# Patient Record
Sex: FEMALE | Race: WHITE | Employment: FULL TIME | URBAN - METROPOLITAN AREA
[De-identification: names, ages, dates, MRNs, and addresses within clinical notes are randomized per-mention and may not be internally consistent; named-entity substitution may affect disease eponyms.]

---

## 2020-04-27 LAB
ABO, EXTERNAL RESULT: NORMAL
HEP B, EXTERNAL RESULT: NORMAL
HIV, EXTERNAL RESULT: NORMAL
RH FACTOR, EXTERNAL RESULT: NORMAL
RPR, EXTERNAL RESULT: NORMAL
RUBELLA TITER, EXTERNAL RESULT: NORMAL

## 2020-09-11 ENCOUNTER — HOSPITAL ENCOUNTER (OUTPATIENT)
Age: 45
Discharge: HOME | End: 2020-09-11
Attending: OBSTETRICS & GYNECOLOGY | Admitting: OBSTETRICS & GYNECOLOGY
Payer: COMMERCIAL

## 2020-09-11 VITALS
TEMPERATURE: 97.3 F | HEIGHT: 67 IN | SYSTOLIC BLOOD PRESSURE: 107 MMHG | WEIGHT: 202 LBS | DIASTOLIC BLOOD PRESSURE: 66 MMHG | HEART RATE: 75 BPM | BODY MASS INDEX: 31.71 KG/M2 | RESPIRATION RATE: 16 BRPM

## 2020-09-11 LAB
CREATININE URINE: 17.8 MG/DL (ref 28–259)
PROTEIN PROTEIN: <4 MG/DL
PROTEIN/CREAT RATIO: ABNORMAL MG/DL

## 2020-09-11 PROCEDURE — 59025 FETAL NON-STRESS TEST: CPT

## 2020-09-11 PROCEDURE — 82570 ASSAY OF URINE CREATININE: CPT

## 2020-09-11 PROCEDURE — 84156 ASSAY OF PROTEIN URINE: CPT

## 2020-09-11 RX ORDER — ASPIRIN 81 MG/1
81 TABLET ORAL DAILY
Status: ON HOLD | COMMUNITY
End: 2020-11-07 | Stop reason: HOSPADM

## 2020-09-11 NOTE — PROGRESS NOTES
Pt came to triage concerned of edema and possible elevated BP at home. 39 yo G1 IVF pregnancy. Since office is closed, came to triage. VSS with normal range BP, asymptomatic. Abdomen soft, NT, gravid, DRT 1+, 1+ pitting LE edema. UPCR was not calculated due to small amount of creatinine and protein in urine sample, pt states that is well hydrated. NST: reactive. Reassurance provided, RTO 4 days for routine appt.   Electronically signed by Drea Juares MD on 9/11/2020 at 6:34 PM

## 2020-09-11 NOTE — PROGRESS NOTES
Pt arrived to triage room D from home for reports of headache and LE swelling and pt unable to be seen in office today. Pt oriented to room and call light. Consents signed and placed in chart. Pt reports normal fetal movement and denies bleeding and denies leaking. Abd soft and non tender. Pt placed on EFM to central bank monitor.  MD called and notified of pt arrival.

## 2020-09-11 NOTE — FLOWSHEET NOTE
Dr. Makayla Estrada updated on pt status per JULIO CÉSAR Keene RN. Labs, vitals and tracing reviewed. Dr. Makayla Estrada and this RN to bedside. Plan of care discussed including discharge home and symptoms to monitor. Discharge teaching completed with pt verbalizes understanding.   Denies questions or concerns for RN/MD.

## 2020-09-11 NOTE — FLOWSHEET NOTE
09/11/20 1845   Fetal Heart Rate   Mode External US   Baseline Rate 130 bpm   Baseline Classification Normal   Variability 6-25 BPM   Pattern A   Patient Feels Fetal Movement Yes   OB Bladder Status Non-distended   OB Bladder Intervention Voiding with Relief   Multiple birth? no   Fetal Monitoring Strip   FMS Reviewed? Yes   FMS Reviewed By? Dr. Jesus Hernández   Uterine Activity   Mode Palpation; [place]   Contractions   (irritability)   Contraction Frequency none   Contraction Duration n/a   Contraction Quality N/A   Resting Tone Palpated Soft

## 2020-10-13 LAB — GBS, EXTERNAL RESULT: NORMAL

## 2020-10-28 ENCOUNTER — OFFICE VISIT (OUTPATIENT)
Dept: PRIMARY CARE CLINIC | Age: 45
End: 2020-10-28
Payer: COMMERCIAL

## 2020-10-28 PROCEDURE — 99211 OFF/OP EST MAY X REQ PHY/QHP: CPT | Performed by: NURSE PRACTITIONER

## 2020-10-28 NOTE — PROGRESS NOTES
Patient presented to [facility] drive up clinic for preop testing. Patient was swabbed and given information advising them to remain isolated until procedure date.

## 2020-10-29 LAB — SARS-COV-2: NOT DETECTED

## 2020-11-06 ENCOUNTER — ANESTHESIA EVENT (OUTPATIENT)
Dept: LABOR AND DELIVERY | Age: 45
End: 2020-11-06
Payer: COMMERCIAL

## 2020-11-06 ENCOUNTER — ANESTHESIA (OUTPATIENT)
Dept: LABOR AND DELIVERY | Age: 45
End: 2020-11-06
Payer: COMMERCIAL

## 2020-11-06 ENCOUNTER — HOSPITAL ENCOUNTER (INPATIENT)
Age: 45
LOS: 3 days | Discharge: HOME | End: 2020-11-09
Attending: OBSTETRICS & GYNECOLOGY | Admitting: OBSTETRICS & GYNECOLOGY
Payer: COMMERCIAL

## 2020-11-06 VITALS — SYSTOLIC BLOOD PRESSURE: 121 MMHG | DIASTOLIC BLOOD PRESSURE: 62 MMHG | OXYGEN SATURATION: 100 %

## 2020-11-06 PROBLEM — Z34.90 TERM PREGNANCY: Status: ACTIVE | Noted: 2020-11-06

## 2020-11-06 LAB
ABO/RH: NORMAL
ABO/RH: NORMAL
AMPHETAMINE SCREEN, URINE: NORMAL
ANTIBODY IDENTIFICATION: NORMAL
ANTIBODY SCREEN: NORMAL
BARBITURATE SCREEN URINE: NORMAL
BENZODIAZEPINE SCREEN, URINE: NORMAL
BUPRENORPHINE URINE: NORMAL
CANNABINOID SCREEN URINE: NORMAL
COCAINE METABOLITE SCREEN URINE: NORMAL
DAT IGG CAPTURE: NORMAL
FETAL SCREEN: NORMAL
HCT VFR BLD CALC: 36 % (ref 36–48)
HEMOGLOBIN: 12 G/DL (ref 12–16)
Lab: NORMAL
MCH RBC QN AUTO: 29.1 PG (ref 26–34)
MCHC RBC AUTO-ENTMCNC: 33.3 G/DL (ref 31–36)
MCV RBC AUTO: 87.3 FL (ref 80–100)
METHADONE SCREEN, URINE: NORMAL
OPIATE SCREEN URINE: NORMAL
OXYCODONE URINE: NORMAL
PDW BLD-RTO: 13.3 % (ref 12.4–15.4)
PH UA: 6.5
PHENCYCLIDINE SCREEN URINE: NORMAL
PLATELET # BLD: 269 K/UL (ref 135–450)
PMV BLD AUTO: 7.1 FL (ref 5–10.5)
PROPOXYPHENE SCREEN: NORMAL
RBC # BLD: 4.12 M/UL (ref 4–5.2)
RHIG LOT NUMBER: NORMAL
TOTAL SYPHILLIS IGG/IGM: NORMAL
WBC # BLD: 11.1 K/UL (ref 4–11)

## 2020-11-06 PROCEDURE — 85027 COMPLETE CBC AUTOMATED: CPT

## 2020-11-06 PROCEDURE — 3609079900 HC CESAREAN SECTION: Performed by: OBSTETRICS & GYNECOLOGY

## 2020-11-06 PROCEDURE — 2500000003 HC RX 250 WO HCPCS: Performed by: NURSE ANESTHETIST, CERTIFIED REGISTERED

## 2020-11-06 PROCEDURE — 36415 COLL VENOUS BLD VENIPUNCTURE: CPT

## 2020-11-06 PROCEDURE — 2580000003 HC RX 258: Performed by: OBSTETRICS & GYNECOLOGY

## 2020-11-06 PROCEDURE — 6360000002 HC RX W HCPCS: Performed by: OBSTETRICS & GYNECOLOGY

## 2020-11-06 PROCEDURE — 86880 COOMBS TEST DIRECT: CPT

## 2020-11-06 PROCEDURE — 86922 COMPATIBILITY TEST ANTIGLOB: CPT

## 2020-11-06 PROCEDURE — 6360000002 HC RX W HCPCS: Performed by: ANESTHESIOLOGY

## 2020-11-06 PROCEDURE — 2580000003 HC RX 258: Performed by: NURSE ANESTHETIST, CERTIFIED REGISTERED

## 2020-11-06 PROCEDURE — 7100000001 HC PACU RECOVERY - ADDTL 15 MIN: Performed by: OBSTETRICS & GYNECOLOGY

## 2020-11-06 PROCEDURE — 1220000000 HC SEMI PRIVATE OB R&B

## 2020-11-06 PROCEDURE — 6370000000 HC RX 637 (ALT 250 FOR IP): Performed by: OBSTETRICS & GYNECOLOGY

## 2020-11-06 PROCEDURE — 86780 TREPONEMA PALLIDUM: CPT

## 2020-11-06 PROCEDURE — 86900 BLOOD TYPING SEROLOGIC ABO: CPT

## 2020-11-06 PROCEDURE — 86870 RBC ANTIBODY IDENTIFICATION: CPT

## 2020-11-06 PROCEDURE — 2709999900 HC NON-CHARGEABLE SUPPLY: Performed by: OBSTETRICS & GYNECOLOGY

## 2020-11-06 PROCEDURE — 80307 DRUG TEST PRSMV CHEM ANLYZR: CPT

## 2020-11-06 PROCEDURE — 7100000000 HC PACU RECOVERY - FIRST 15 MIN: Performed by: OBSTETRICS & GYNECOLOGY

## 2020-11-06 PROCEDURE — 86850 RBC ANTIBODY SCREEN: CPT

## 2020-11-06 PROCEDURE — 2500000003 HC RX 250 WO HCPCS: Performed by: OBSTETRICS & GYNECOLOGY

## 2020-11-06 PROCEDURE — 86901 BLOOD TYPING SEROLOGIC RH(D): CPT

## 2020-11-06 PROCEDURE — 3700000001 HC ADD 15 MINUTES (ANESTHESIA): Performed by: OBSTETRICS & GYNECOLOGY

## 2020-11-06 PROCEDURE — 85461 HEMOGLOBIN FETAL: CPT

## 2020-11-06 PROCEDURE — 96372 THER/PROPH/DIAG INJ SC/IM: CPT

## 2020-11-06 PROCEDURE — 6360000002 HC RX W HCPCS: Performed by: NURSE ANESTHETIST, CERTIFIED REGISTERED

## 2020-11-06 PROCEDURE — 3700000000 HC ANESTHESIA ATTENDED CARE: Performed by: OBSTETRICS & GYNECOLOGY

## 2020-11-06 PROCEDURE — 59025 FETAL NON-STRESS TEST: CPT

## 2020-11-06 RX ORDER — SODIUM CHLORIDE 0.9 % (FLUSH) 0.9 %
10 SYRINGE (ML) INJECTION EVERY 12 HOURS SCHEDULED
Status: DISCONTINUED | OUTPATIENT
Start: 2020-11-06 | End: 2020-11-09 | Stop reason: HOSPADM

## 2020-11-06 RX ORDER — KETOROLAC TROMETHAMINE 30 MG/ML
30 INJECTION, SOLUTION INTRAMUSCULAR; INTRAVENOUS EVERY 6 HOURS
Status: COMPLETED | OUTPATIENT
Start: 2020-11-06 | End: 2020-11-07

## 2020-11-06 RX ORDER — LANOLIN 100 %
OINTMENT (GRAM) TOPICAL
Status: DISCONTINUED | OUTPATIENT
Start: 2020-11-06 | End: 2020-11-09 | Stop reason: HOSPADM

## 2020-11-06 RX ORDER — ONDANSETRON 2 MG/ML
4 INJECTION INTRAMUSCULAR; INTRAVENOUS EVERY 6 HOURS PRN
Status: DISCONTINUED | OUTPATIENT
Start: 2020-11-06 | End: 2020-11-06 | Stop reason: HOSPADM

## 2020-11-06 RX ORDER — EPHEDRINE SULFATE/0.9% NACL/PF 50 MG/5 ML
SYRINGE (ML) INTRAVENOUS PRN
Status: DISCONTINUED | OUTPATIENT
Start: 2020-11-06 | End: 2020-11-06 | Stop reason: SDUPTHER

## 2020-11-06 RX ORDER — ONDANSETRON 2 MG/ML
4 INJECTION INTRAMUSCULAR; INTRAVENOUS EVERY 6 HOURS PRN
Status: DISCONTINUED | OUTPATIENT
Start: 2020-11-06 | End: 2020-11-09 | Stop reason: HOSPADM

## 2020-11-06 RX ORDER — OXYTOCIN 10 [USP'U]/ML
INJECTION, SOLUTION INTRAMUSCULAR; INTRAVENOUS PRN
Status: DISCONTINUED | OUTPATIENT
Start: 2020-11-06 | End: 2020-11-06 | Stop reason: SDUPTHER

## 2020-11-06 RX ORDER — ACETAMINOPHEN 650 MG/1
650 SUPPOSITORY RECTAL EVERY 4 HOURS PRN
Status: ACTIVE | OUTPATIENT
Start: 2020-11-06 | End: 2020-11-07

## 2020-11-06 RX ORDER — PRENATAL VIT/IRON FUM/FOLIC AC 27MG-0.8MG
1 TABLET ORAL DAILY
Status: DISCONTINUED | OUTPATIENT
Start: 2020-11-07 | End: 2020-11-09 | Stop reason: HOSPADM

## 2020-11-06 RX ORDER — SODIUM CHLORIDE, SODIUM LACTATE, POTASSIUM CHLORIDE, CALCIUM CHLORIDE 600; 310; 30; 20 MG/100ML; MG/100ML; MG/100ML; MG/100ML
INJECTION, SOLUTION INTRAVENOUS CONTINUOUS
Status: DISCONTINUED | OUTPATIENT
Start: 2020-11-06 | End: 2020-11-09 | Stop reason: HOSPADM

## 2020-11-06 RX ORDER — DIPHENHYDRAMINE HYDROCHLORIDE 50 MG/ML
25 INJECTION INTRAMUSCULAR; INTRAVENOUS EVERY 6 HOURS PRN
Status: DISCONTINUED | OUTPATIENT
Start: 2020-11-06 | End: 2020-11-09 | Stop reason: HOSPADM

## 2020-11-06 RX ORDER — NALOXONE HYDROCHLORIDE 0.4 MG/ML
0.4 INJECTION, SOLUTION INTRAMUSCULAR; INTRAVENOUS; SUBCUTANEOUS PRN
Status: DISCONTINUED | OUTPATIENT
Start: 2020-11-06 | End: 2020-11-09 | Stop reason: HOSPADM

## 2020-11-06 RX ORDER — METHYLERGONOVINE MALEATE 0.2 MG/ML
200 INJECTION INTRAVENOUS PRN
Status: DISCONTINUED | OUTPATIENT
Start: 2020-11-06 | End: 2020-11-09 | Stop reason: HOSPADM

## 2020-11-06 RX ORDER — SODIUM CHLORIDE, SODIUM LACTATE, POTASSIUM CHLORIDE, AND CALCIUM CHLORIDE .6; .31; .03; .02 G/100ML; G/100ML; G/100ML; G/100ML
1000 INJECTION, SOLUTION INTRAVENOUS ONCE
Status: DISCONTINUED | OUTPATIENT
Start: 2020-11-06 | End: 2020-11-09 | Stop reason: HOSPADM

## 2020-11-06 RX ORDER — NICOTINE 21 MG/24HR
1 PATCH, TRANSDERMAL 24 HOURS TRANSDERMAL DAILY
Status: DISCONTINUED | OUTPATIENT
Start: 2020-11-06 | End: 2020-11-06

## 2020-11-06 RX ORDER — OXYCODONE HYDROCHLORIDE AND ACETAMINOPHEN 5; 325 MG/1; MG/1
1 TABLET ORAL EVERY 4 HOURS PRN
Status: DISCONTINUED | OUTPATIENT
Start: 2020-11-06 | End: 2020-11-09 | Stop reason: HOSPADM

## 2020-11-06 RX ORDER — OXYCODONE HYDROCHLORIDE AND ACETAMINOPHEN 5; 325 MG/1; MG/1
2 TABLET ORAL EVERY 4 HOURS PRN
Status: DISCONTINUED | OUTPATIENT
Start: 2020-11-06 | End: 2020-11-09 | Stop reason: HOSPADM

## 2020-11-06 RX ORDER — IBUPROFEN 400 MG/1
800 TABLET ORAL EVERY 8 HOURS PRN
Status: DISCONTINUED | OUTPATIENT
Start: 2020-11-07 | End: 2020-11-09 | Stop reason: HOSPADM

## 2020-11-06 RX ORDER — SODIUM CHLORIDE 0.9 % (FLUSH) 0.9 %
10 SYRINGE (ML) INJECTION PRN
Status: DISCONTINUED | OUTPATIENT
Start: 2020-11-06 | End: 2020-11-09 | Stop reason: HOSPADM

## 2020-11-06 RX ORDER — ONDANSETRON 2 MG/ML
INJECTION INTRAMUSCULAR; INTRAVENOUS PRN
Status: DISCONTINUED | OUTPATIENT
Start: 2020-11-06 | End: 2020-11-06 | Stop reason: SDUPTHER

## 2020-11-06 RX ORDER — KETOROLAC TROMETHAMINE 30 MG/ML
INJECTION, SOLUTION INTRAMUSCULAR; INTRAVENOUS PRN
Status: DISCONTINUED | OUTPATIENT
Start: 2020-11-06 | End: 2020-11-06 | Stop reason: SDUPTHER

## 2020-11-06 RX ORDER — NALBUPHINE HCL 10 MG/ML
5 AMPUL (ML) INJECTION EVERY 4 HOURS PRN
Status: DISCONTINUED | OUTPATIENT
Start: 2020-11-06 | End: 2020-11-06 | Stop reason: RX

## 2020-11-06 RX ORDER — ESTRADIOL 2 MG/1
2 TABLET ORAL DAILY
Status: ON HOLD | COMMUNITY
End: 2020-11-07 | Stop reason: HOSPADM

## 2020-11-06 RX ORDER — SIMETHICONE 80 MG
80 TABLET,CHEWABLE ORAL EVERY 6 HOURS PRN
Status: DISCONTINUED | OUTPATIENT
Start: 2020-11-06 | End: 2020-11-09 | Stop reason: HOSPADM

## 2020-11-06 RX ORDER — SODIUM CHLORIDE, SODIUM LACTATE, POTASSIUM CHLORIDE, CALCIUM CHLORIDE 600; 310; 30; 20 MG/100ML; MG/100ML; MG/100ML; MG/100ML
INJECTION, SOLUTION INTRAVENOUS CONTINUOUS PRN
Status: DISCONTINUED | OUTPATIENT
Start: 2020-11-06 | End: 2020-11-06 | Stop reason: SDUPTHER

## 2020-11-06 RX ORDER — SODIUM CHLORIDE, SODIUM LACTATE, POTASSIUM CHLORIDE, AND CALCIUM CHLORIDE .6; .31; .03; .02 G/100ML; G/100ML; G/100ML; G/100ML
1000 INJECTION, SOLUTION INTRAVENOUS ONCE
Status: COMPLETED | OUTPATIENT
Start: 2020-11-06 | End: 2020-11-06

## 2020-11-06 RX ORDER — FENTANYL CITRATE 50 UG/ML
INJECTION, SOLUTION INTRAMUSCULAR; INTRAVENOUS PRN
Status: DISCONTINUED | OUTPATIENT
Start: 2020-11-06 | End: 2020-11-06

## 2020-11-06 RX ORDER — MORPHINE SULFATE 10 MG/ML
INJECTION, SOLUTION INTRAMUSCULAR; INTRAVENOUS PRN
Status: DISCONTINUED | OUTPATIENT
Start: 2020-11-06 | End: 2020-11-06 | Stop reason: SDUPTHER

## 2020-11-06 RX ORDER — DOCUSATE SODIUM 100 MG/1
100 CAPSULE, LIQUID FILLED ORAL 2 TIMES DAILY
Status: DISCONTINUED | OUTPATIENT
Start: 2020-11-06 | End: 2020-11-09 | Stop reason: HOSPADM

## 2020-11-06 RX ORDER — FENTANYL CITRATE 50 UG/ML
INJECTION, SOLUTION INTRAMUSCULAR; INTRAVENOUS PRN
Status: DISCONTINUED | OUTPATIENT
Start: 2020-11-06 | End: 2020-11-06 | Stop reason: SDUPTHER

## 2020-11-06 RX ORDER — METOCLOPRAMIDE HYDROCHLORIDE 5 MG/ML
10 INJECTION INTRAMUSCULAR; INTRAVENOUS ONCE
Status: COMPLETED | OUTPATIENT
Start: 2020-11-06 | End: 2020-11-06

## 2020-11-06 RX ADMIN — Medication 10 MG: at 10:28

## 2020-11-06 RX ADMIN — HUMAN RHO(D) IMMUNE GLOBULIN 300 MCG: 300 INJECTION, SOLUTION INTRAMUSCULAR at 20:32

## 2020-11-06 RX ADMIN — MORPHINE SULFATE 0.25 MG: 10 INJECTION INTRAVENOUS at 10:23

## 2020-11-06 RX ADMIN — FENTANYL CITRATE 12.5 MCG: 50 INJECTION INTRAMUSCULAR; INTRAVENOUS at 10:23

## 2020-11-06 RX ADMIN — CEFAZOLIN SODIUM 2 G: 10 INJECTION, POWDER, FOR SOLUTION INTRAVENOUS at 10:15

## 2020-11-06 RX ADMIN — SODIUM CHLORIDE, SODIUM LACTATE, POTASSIUM CHLORIDE, AND CALCIUM CHLORIDE: .6; .31; .03; .02 INJECTION, SOLUTION INTRAVENOUS at 10:49

## 2020-11-06 RX ADMIN — ONDANSETRON 4 MG: 2 INJECTION INTRAMUSCULAR; INTRAVENOUS at 10:15

## 2020-11-06 RX ADMIN — KETOROLAC TROMETHAMINE 30 MG: 30 INJECTION, SOLUTION INTRAMUSCULAR at 11:14

## 2020-11-06 RX ADMIN — Medication 334 ML/HR: at 11:18

## 2020-11-06 RX ADMIN — Medication 10 MG: at 10:35

## 2020-11-06 RX ADMIN — Medication 10 MG: at 10:53

## 2020-11-06 RX ADMIN — SODIUM CHLORIDE, POTASSIUM CHLORIDE, SODIUM LACTATE AND CALCIUM CHLORIDE 1000 ML: 600; 310; 30; 20 INJECTION, SOLUTION INTRAVENOUS at 09:14

## 2020-11-06 RX ADMIN — Medication 10 MG: at 10:48

## 2020-11-06 RX ADMIN — Medication 10 MG: at 11:01

## 2020-11-06 RX ADMIN — DOCUSATE SODIUM 100 MG: 100 CAPSULE, LIQUID FILLED ORAL at 20:31

## 2020-11-06 RX ADMIN — METOCLOPRAMIDE HYDROCHLORIDE 10 MG: 5 INJECTION INTRAMUSCULAR; INTRAVENOUS at 08:54

## 2020-11-06 RX ADMIN — SODIUM CHLORIDE, SODIUM LACTATE, POTASSIUM CHLORIDE, AND CALCIUM CHLORIDE: .6; .31; .03; .02 INJECTION, SOLUTION INTRAVENOUS at 11:19

## 2020-11-06 RX ADMIN — FAMOTIDINE 20 MG: 10 INJECTION INTRAVENOUS at 08:54

## 2020-11-06 RX ADMIN — SODIUM CHLORIDE, SODIUM LACTATE, POTASSIUM CHLORIDE, AND CALCIUM CHLORIDE: .6; .31; .03; .02 INJECTION, SOLUTION INTRAVENOUS at 10:15

## 2020-11-06 RX ADMIN — KETOROLAC TROMETHAMINE 30 MG: 30 INJECTION, SOLUTION INTRAMUSCULAR at 20:31

## 2020-11-06 RX ADMIN — OXYTOCIN 20 UNITS: 10 INJECTION INTRAVENOUS at 10:48

## 2020-11-06 RX ADMIN — Medication: at 11:35

## 2020-11-06 ASSESSMENT — PULMONARY FUNCTION TESTS
PIF_VALUE: 0
PIF_VALUE: 1
PIF_VALUE: 0

## 2020-11-06 ASSESSMENT — PAIN SCALES - GENERAL
PAINLEVEL_OUTOF10: 0

## 2020-11-06 NOTE — ANESTHESIA PRE PROCEDURE
Department of Anesthesiology  Preprocedure Note       Name:  Radha Baron   Age:  40 y.o.  :  1975                                          MRN:  [id number]         Date:  2020      Surgeon: Radha Soto):  Daniel Garrett MD    Procedure: Procedure(s):   SECTION    Medications prior to admission:   Prior to Admission medications    Medication Sig Start Date End Date Taking?  Authorizing Provider   Prenatal MV-Min-Fe Fum-FA-DHA (PRENATAL 1 PO) Take 1 tablet by mouth daily   Yes Historical Provider, MD   aspirin 81 MG EC tablet Take 81 mg by mouth daily   Yes Historical Provider, MD   estradiol (ESTRACE) 2 MG tablet Take 2 mg by mouth daily During first & half of second trimester    Historical Provider, MD   PROGESTERONE IM Inject into the muscle First trimester & half of second trimester    Historical Provider, MD       Current medications:    Current Facility-Administered Medications   Medication Dose Route Frequency Provider Last Rate Last Dose    lactated ringers infusion   Intravenous Continuous Daniel Garrett MD        sodium chloride flush 0.9 % injection 10 mL  10 mL Intravenous 2 times per day Daniel Garrett MD        sodium chloride flush 0.9 % injection 10 mL  10 mL Intravenous PRN Daniel Garrett MD        oxytocin (PITOCIN) 30 units in 500 mL infusion   Intravenous PRN Daniel Garrett MD        And    oxytocin (PITOCIN) 30 units in 500 mL infusion  95 shmuel-units/min Intravenous PRN Daniel Garrett MD        lactated ringers infusion   Intravenous Continuous Daniel Garrett MD        lactated ringers bolus  1,000 mL Intravenous Once Daniel Garrett MD 1,000 mL/hr at 20 0854      sodium chloride flush 0.9 % injection 10 mL  10 mL Intravenous 2 times per day Daniel Garrett MD        sodium chloride flush 0.9 % injection 10 mL  10 mL Intravenous PRN Daniel Garrett MD        topical skin adhesive stick   Topical Once Aye Kraft, MD        ondansetron (ZOFRAN) injection 4 mg  4 mg Intravenous Q6H PRN Catrina Young MD         Facility-Administered Medications Ordered in Other Encounters   Medication Dose Route Frequency Provider Last Rate Last Dose    lactated ringers infusion    Continuous PRN Susy Hurst, APRN - CRNA        ondansetron [facility] injection    PRN Susy Hurst, APRN - CRNA   4 mg at 11/06/20 1015    ePHEDrine injection    PRN Susy Hurst, APRN - CRNA   10 mg at 11/06/20 1035       Allergies:  No Known Allergies    Problem List:    Patient Active Problem List   Diagnosis Code    Cholecystitis with cholelithiasis K80.10    Term pregnancy Z34.90       Past Medical History:        Diagnosis Date    Anesthesia complication     slow to wake up    Gall stones     IBS (irritable bowel syndrome)     In vitro fertilization     Infertility, female     Mononucleosis     UTI (lower urinary tract infection) 4weeks ago       Past Surgical History:        Procedure Laterality Date    CHOLECYSTECTOMY, LAPAROSCOPIC  2/11/14    ROTATOR CUFF REPAIR Left     WISDOM TOOTH EXTRACTION         Social History:    Social History     Tobacco Use    Smoking status: Never Smoker    Smokeless tobacco: Never Used   Substance Use Topics    Alcohol use: Not Currently     Alcohol/week: 2.0 standard drinks     Types: 2 Glasses of wine per week     Comment: social                                Counseling given: Not Answered      Vital Signs (Current):   Vitals:    11/06/20 0857   BP: 110/77   Pulse: 92   Resp: 20   Temp: 36.8 °C (98.3 °F)   TempSrc: Oral   Weight: 216 lb (98 kg)   Height: 5' 7\" (1.702 m)                                              BP Readings from Last 3 Encounters:   11/06/20 110/77   11/06/20 114/63   09/11/20 107/66       NPO Status:                                                                                 BMI:   Wt Readings from Last 3 Encounters:   11/06/20 216 lb (98 kg)   09/11/20 202 lb (91.6 kg) ROS.                                       Anesthesia Plan      spinal     ASA 2             Anesthetic plan and risks discussed with patient and spouse. Use of blood products discussed with patient and spouse whom consented to blood products. Plan discussed with CRNA. OB History        1    Para        Term                AB        Living           SAB        TAB        Ectopic        Molar        Multiple        Live Births                    Quinten Osborne is a 40y.o. year-old female admitted to [facility], for SAB. Gestational age is 38w3d. Her Body mass index is 33.83 kg/m². She was seen, examined and her chart was reviewed (including anesthesia, drug and allergy history). No interval changes are noted to her history and physical examination. (except as noted above).     Risks/benefits/alternatives of both neuraxial and general anesthesia were discussed and she agrees to proceed at the direction of the care team.    NICO Terrazas CRNA  2020  10:40 AM  NICO Terrazas CRNA   2020

## 2020-11-06 NOTE — H&P
Department of Obstetrics and Gynecology   Obstetrics History and Physical        CHIEF COMPLAINT:  Elective CS    HISTORY OF PRESENT ILLNESS:    Melissa Current  is a 40 y.o.  female at 38w3d presents with a chief complaint as above and is being admitted for elective CS without tubal ligation     Estimated Due Date: Estimated Date of Delivery: 11/10/20    PRENATAL CARE: Complicated by: 1. AMA  2. IVF pregnancy     PAST OB HISTORY:  OB History        1    Para        Term                AB        Living           SAB        TAB        Ectopic        Molar        Multiple        Live Births                  Past Medical History:        Diagnosis Date    Anesthesia complication     slow to wake up    Gall stones     IBS (irritable bowel syndrome)     In vitro fertilization     Infertility, female     Mononucleosis     UTI (lower urinary tract infection) 4weeks ago     Past Surgical History:        Procedure Laterality Date    CHOLECYSTECTOMY, LAPAROSCOPIC  14    ROTATOR CUFF REPAIR Left     WISDOM TOOTH EXTRACTION       Allergies:  Patient has no known allergies.   Social History:    Social History     Socioeconomic History    Marital status:      Spouse name: Not on file    Number of children: Not on file    Years of education: Not on file    Highest education level: Not on file   Occupational History    Not on file   Social Needs    Financial resource strain: Not on file    Food insecurity     Worry: Not on file     Inability: Not on file   [employer] needs     Medical: Not on file     Non-medical: Not on file   Tobacco Use    Smoking status: Never Smoker    Smokeless tobacco: Never Used   Substance and Sexual Activity    Alcohol use: Not Currently     Alcohol/week: 2.0 standard drinks     Types: 2 Glasses of wine per week     Comment: social    Drug use: No    Sexual activity: Yes     Partners: Male   Lifestyle    Physical activity     Days per week: Not on file     Minutes per session: Not on file    Stress: Not on file   Relationships    Social connections     Talks on phone: Not on file     Gets together: Not on file     Attends [employer] service: Not on file     Active member of club or organization: Not on file     Attends meetings of clubs or organizations: Not on file     Relationship status: Not on file    Intimate partner violence     Fear of current or ex partner: Not on file     Emotionally abused: Not on file     Physically abused: Not on file     Forced sexual activity: Not on file   Other Topics Concern    Not on file   Social History Narrative    Not on file     Family History:       Problem Relation Age of Onset    Breast Cancer Maternal Grandmother 44    Diabetes Mother     Hypertension Mother     Mult Sclerosis Father      Medications Prior to Admission:  Medications Prior to Admission: Prenatal MV-Min-Fe Fum-FA-DHA (PRENATAL 1 PO), Take 1 tablet by mouth daily  aspirin 81 MG EC tablet, Take 81 mg by mouth daily  estradiol (ESTRACE) 2 MG tablet, Take 2 mg by mouth daily During first & half of second trimester  PROGESTERONE IM, Inject into the muscle First trimester & half of second trimester    REVIEW OF SYSTEMS:  negative     PHYSICAL EXAM:    Vitals:    11/06/20 0857   BP: 110/77   Pulse: 92   Resp: 20   Temp: 98.3 °F (36.8 °C)   TempSrc: Oral   Weight: 216 lb (98 kg)   Height: 5' 7\" (1.702 m)     General appearance:  awake, alert, cooperative, no apparent distress, and appears stated age  Neurologic:  Awake, alert, oriented to name, place and time.     Lungs:  No increased work of breathing, good air exchange  Abdomen:  Soft, non tender, gravid, fundal height consistent with the gestational age, EFW by Leopold's maneuvers is 3400 grams  Pelvis:  Adequate pelvis  Cervix: closed  Contraction frequency: none  Membranes:  Intact  Labs:   CBC:   Lab Results   Component Value Date    WBC 11.1 11/06/2020    RBC 4.12 11/06/2020    HGB 12.0 11/06/2020    HCT 36.0 11/06/2020    MCV 87.3 11/06/2020    MCH 29.1 11/06/2020    MCHC 33.3 11/06/2020    RDW 13.3 11/06/2020     11/06/2020    MPV 7.1 11/06/2020       ASSESSMENT: 41 yo G1 @ 39 3/7 w    1.  Elective CS due to [facility] and IVF pregnancy     PLAN: Admit  Labor: Discussed RBC of surgery at length, expectations of recovery and all Qs were answered   Fetus: Reassuring  GBS: Negative    Electronically signed by Yasmeen Babb MD on 11/6/2020 at 10:05 AM

## 2020-11-06 NOTE — ANESTHESIA PROCEDURE NOTES
Spinal Block    Patient location during procedure: OB  Start time: 11/6/2020 10:14 AM  End time: 11/6/2020 10:25 AM  Reason for block: primary anesthetic  Preanesthetic Checklist  Completed: patient identified, site marked, surgical consent, pre-op evaluation, timeout performed, IV checked, risks and benefits discussed, monitors and equipment checked, anesthesia consent given, oxygen available and patient being monitored  Spinal Block  Patient position: sitting  Prep: Betadine and site prepped and draped  Patient monitoring: continuous pulse ox and frequent blood pressure checks  Approach: midline  Location: L3/L4  Procedures: paresthesia technique  Provider prep: mask and sterile gloves  Local infiltration: lidocaine  Agent: bupivacaine  Dose: 1.9  Dose: 1.9  Needle  Needle type:  Gage   Needle gauge: 27 G  Needle length: 3.5 in  Needle insertion depth: 6 cm  Assessment  Sensory level: T4  CSF: clear  Attempts: 1  Hemodynamics: stable  Additional Notes  Duramorph 0.25 mg & Fentanyl 12.5 mcg

## 2020-11-06 NOTE — FLOWSHEET NOTE
Dr Gualberto Santana notified uterus boggy at times but firms immediately with massage. Bleeding WNL. No clots. Dr Gualberto Santana came to bedside & palpated fundus & found it to be firm.

## 2020-11-06 NOTE — PLAN OF CARE
Problem: Anxiety:  Goal: Level of anxiety will decrease  Description: Level of anxiety will decrease  Outcome: Ongoing     Problem: Aspiration - Risk of:  Goal: Absence of aspiration  Description: Absence of aspiration  Outcome: Ongoing     Problem: Tissue Perfusion - Uteroplacental, Altered:  Description: [TRUNCATED] For intrapartum patients with recurrent variable decelerations of the fetal heart rate, consider transcervical amnioinfusion. - For patients in labor, avoid prophylactic use of continuous maternal oxygen supplementation to prevent nonreas . Bernadette Tripp [TRUNCATED] For intrapartum patients with recurrent variable decelerations of the fetal heart rate, consider transcervical amnioinfusion. - For patients in labor, avoid prophylactic use of continuous maternal oxygen supplementation to prevent nonreas . Bernadette Tripp [TRUNCATED] For intrapartum patients with recurrent variable decelerations of the fetal heart rate, consider transcervical amnioinfusion. - For patients in labor, avoid prophylactic use of continuous maternal oxygen supplementation to prevent nonreas . Bernadette Tripp [TRUNCATED] For intrapartum patients with recurrent variable decelerations of the fetal heart rate, consider transcervical amnioinfusion. - For patients in labor, avoid prophylactic use of continuous maternal oxygen supplementation to prevent nonreas . Bernadette Tripp [TRUNCATED] For intrapartum patients with recurrent variable decelerations of the fetal heart rate, consider transcervical amnioinfusion. - For patients in labor, avoid prophylactic use of continuous maternal oxygen supplementation to prevent nonreas . ..   Goal: Absence of abnormal fetal heart rate pattern  Description: Absence of abnormal fetal heart rate pattern  Outcome: Ongoing     Problem: Venous Thromboembolism - Risk of:  Goal: Will show no signs or symptoms of venous thromboembolism  Description: Will show no signs or symptoms of venous thromboembolism  Outcome: Ongoing

## 2020-11-06 NOTE — ANESTHESIA POSTPROCEDURE EVALUATION
Department of Anesthesiology  Postprocedure Note    Patient: Armando Albrecht  MRN: [id number]  YOB: 1975  Date of evaluation: 2020  Time:  11:36 AM     Procedure Summary     Date:  20 Room / Location:  [facility] OR [address] [place] [address]    Anesthesia Start:  2513 Anesthesia Stop:  5368    Procedure:   SECTION with low transverse uterine incision at 1044 (N/A ) Diagnosis:       S/P primary low transverse       (S/P primary low transverse  [V66.425])    Surgeon:  Adrian Sadler MD Responsible Provider:  Janelle Castro MD    Anesthesia Type:  spinal ASA Status:  2          Anesthesia Type: No value filed. [place] Phase I:      [place] Phase II:      Last vitals: Reviewed and per EMR flowsheets.        Anesthesia Post Evaluation    Patient location during evaluation: PACU  Patient participation: complete - patient participated  Level of consciousness: awake and alert  Pain score: 0  Airway patency: patent  Nausea & Vomiting: no vomiting and no nausea  Complications: no  Cardiovascular status: hemodynamically stable  Respiratory status: spontaneous ventilation and room air  Hydration status: stable  Comments: /77   Pulse 92   Temp 36.8 °C (98.3 °F) (Oral)   Resp 20   Ht 5' 7\" (1.702 m)   Wt 216 lb (98 kg)   BMI 33.83 kg/m²

## 2020-11-06 NOTE — PLAN OF CARE
by Heather Quinones RN  Outcome: Ongoing     Problem: Venous Thromboembolism - Risk of:  Goal: Will show no signs or symptoms of venous thromboembolism  Description: Will show no signs or symptoms of venous thromboembolism  11/6/2020 1301 by Heather Quinones RN  Outcome: Completed  11/6/2020 0932 by Heather Quinones RN  Outcome: Ongoing

## 2020-11-06 NOTE — PROGRESS NOTES
Pt here for scheduled primary c/s.  Jon Gentleman present as support person. Pt has hx infertility & had IVF.

## 2020-11-06 NOTE — PLAN OF CARE
Problem: Anxiety:  Goal: Level of anxiety will decrease  Description: Level of anxiety will decrease  11/6/2020 1301 by Simeon Santos RN  Outcome: Completed  11/6/2020 0932 by Simeon Santos RN  Outcome: Ongoing     Problem: Aspiration - Risk of:  Goal: Absence of aspiration  Description: Absence of aspiration  11/6/2020 1301 by Simeon Santos RN  Outcome: Completed  11/6/2020 0932 by Simeon Santos RN  Outcome: Ongoing     Problem: Tissue Perfusion - Uteroplacental, Altered:  Description: [TRUNCATED] For intrapartum patients with recurrent variable decelerations of the fetal heart rate, consider transcervical amnioinfusion. - For patients in labor, avoid prophylactic use of continuous maternal oxygen supplementation to prevent nonreas . [place] [TRUNCATED] For intrapartum patients with recurrent variable decelerations of the fetal heart rate, consider transcervical amnioinfusion. - For patients in labor, avoid prophylactic use of continuous maternal oxygen supplementation to prevent nonreas . [place] [TRUNCATED] For intrapartum patients with recurrent variable decelerations of the fetal heart rate, consider transcervical amnioinfusion. - For patients in labor, avoid prophylactic use of continuous maternal oxygen supplementation to prevent nonreas . [place] [TRUNCATED] For intrapartum patients with recurrent variable decelerations of the fetal heart rate, consider transcervical amnioinfusion. - For patients in labor, avoid prophylactic use of continuous maternal oxygen supplementation to prevent nonreas . [place] [TRUNCATED] For intrapartum patients with recurrent variable decelerations of the fetal heart rate, consider transcervical amnioinfusion. - For patients in labor, avoid prophylactic use of continuous maternal oxygen supplementation to prevent nonreas . ..   Goal: Absence of abnormal fetal heart rate pattern  Description: Absence of abnormal fetal heart rate pattern  11/6/2020 1301 by Simeon Santos RN  Outcome: Completed  11/6/2020 0932 by Humphrey Jain RN  Outcome: Ongoing     Problem: Venous Thromboembolism - Risk of:  Goal: Will show no signs or symptoms of venous thromboembolism  Description: Will show no signs or symptoms of venous thromboembolism  11/6/2020 1301 by Humphrey Jain RN  Outcome: Completed  11/6/2020 0932 by Humphrey Jain RN  Outcome: Ongoing

## 2020-11-06 NOTE — L&D DELIVERY NOTE
OPERATIVE NOTE    Date of surgery: 2020    Prenatal Care: Complicated by: 1. H/o primary infertility  2. IVF pregnancy 3. AMA 4. H/o endometriosis   Pre-operative Diagnosis: 1. IUP @ 39 3/7 w 2. Elective CS   Post-operative Diagnosis: the same   Procedure: PLTCS  Anesthesia: Spinal  Surgeon: Dr Cari Alfredo  Assistant: Radha/Dami  Antibiotics: Ancef  : Male, weight: 3250 g, Apgars 9/9  Findings: Normal uterus, tubes, ovaries, there was an adhesion b/w bladder and lower uterine segment, likely as a result of pelvic endometriosis. No significant adhesions were noted in the pelvis and around adnexa. Complications: None  Specimens: None  Urine Output: 100 ml   Quantified blood loss: 956 ml     Indications: Patient is Dexter Fernandez  is a 40 y.o.  female at 38w3d was admitted for elective  considering her prior complex Ob-Gyn history. The risks, benefits and alternatives of  section were discussed with the patient, including but not limited to infection, allergic reaction, severe loss of blood, thrombosis, injury to bowel, bladder, fistula, injury to blood vessels, hysterectomy, injury to fetus, need for blood transfusion, herniation at the incisional site, and risk of repeat cesareans / trial of labor after . Patient elected to proceed, informed consent was signed. Procedure Details: The patient was taken to the operating room, placed in the supine position with the leftward tilt. Spinal anesthesia was found to be adequate. Neely catheter was placed in the bladder. The patient was prepped and draped in the normal sterile fashion. Time out was performed, identifying correct patients name, date of birth, and type of the procedure. Pfannenstiel skin incision was made with the scalpel and carried down to the underlying fascia. The fascial was incised and incision was extended laterally bluntly. The rectus muscle was divided in the midline bluntly.  The peritoneum was entered bluntly  and extended inferiorly and superiorly with traction. The Robert retractor was then inserted. The vesicouterine peritoneum was identified, grasped with pick-ups and entered sharply with Metzenbaum scissors. The bladder flap was then created digitally. A low transverse uterine incision was made with the scalpel and extended laterally with blunt finger dissection. The baby was delivered atraumatically in cephalic presentation. The nose and mouth were suctioned. The cord was clamped and cut and the baby was handed off to the waiting nursing staff. Placenta was then delivered manually. The uterus was not  exteriorized and it was cleared of all clots and debris. The uterine incision was closed in two layers. The first layer with running locked layer of 0 Monocryl. The second layer was an imbricating layer of 0 Monocryl with good hemostasis assured. The paracolic gutters were cleaned with moistened laps, removing blood clots and debris. Good hemostasis was again reassured throughout. The Robert retractor was then removed from the abdomen. The peritoneum was closed with 3-0 Vicryl in a running fashion after first and second lap and instrument count were correct. The rectus muscles were approximated with 4 interrupted matress sutures of O-Vycril to prevent future diastasis. The fascia was closed with 0 Vicryl in a running fashion. Good hemostasis was assured. The subcuticular layers were irrigated with warm normal saline and bleeding controlled with Bovie cautery. The subcuticular layer was reapproximated with 3-0 Vicryl in interrupted fashion. The skin was closed with a 4-0 Vicryl in a subcuticular fashion. Derma bond was applied to skin. The patient tolerated the procedure well. Sponge, lap, and needle counts were correct times three and the patient was taken to recovery in stable condition.     Electronically signed by Neymar Branch MD on 11/6/2020 at 1:31 PM

## 2020-11-06 NOTE — FLOWSHEET NOTE
11/06/20 0930   Fetal Heart Rate   Mode External US   Baseline Rate 130 bpm   Baseline Classification Normal   Variability 6-25 BPM   Pattern A   Patient Feels Fetal Movement Yes   OB Bladder Status Non-distended;Voiding   OB Bladder Intervention Voiding with Relief   Fetal Monitoring Strip   FMS Reviewed? Yes   FMS Reviewed By? LL   Uterine Activity   Mode Palpation; [place]   Contraction Frequency 0   Resting Tone Palpated Soft

## 2020-11-07 LAB
BLOOD BANK DISPENSE STATUS: NORMAL
BLOOD BANK DISPENSE STATUS: NORMAL
BLOOD BANK PRODUCT CODE: NORMAL
BLOOD BANK PRODUCT CODE: NORMAL
BPU ID: NORMAL
BPU ID: NORMAL
DESCRIPTION BLOOD BANK: NORMAL
DESCRIPTION BLOOD BANK: NORMAL
HCT VFR BLD CALC: 27.7 % (ref 36–48)
HEMOGLOBIN: 9.2 G/DL (ref 12–16)
MCH RBC QN AUTO: 29.2 PG (ref 26–34)
MCHC RBC AUTO-ENTMCNC: 33.2 G/DL (ref 31–36)
MCV RBC AUTO: 88 FL (ref 80–100)
PDW BLD-RTO: 13.6 % (ref 12.4–15.4)
PLATELET # BLD: 220 K/UL (ref 135–450)
PMV BLD AUTO: 7.2 FL (ref 5–10.5)
RBC # BLD: 3.15 M/UL (ref 4–5.2)
WBC # BLD: 11.2 K/UL (ref 4–11)

## 2020-11-07 PROCEDURE — 85027 COMPLETE CBC AUTOMATED: CPT

## 2020-11-07 PROCEDURE — 36415 COLL VENOUS BLD VENIPUNCTURE: CPT

## 2020-11-07 PROCEDURE — 6360000002 HC RX W HCPCS: Performed by: ANESTHESIOLOGY

## 2020-11-07 PROCEDURE — 2580000003 HC RX 258: Performed by: OBSTETRICS & GYNECOLOGY

## 2020-11-07 PROCEDURE — 6370000000 HC RX 637 (ALT 250 FOR IP): Performed by: OBSTETRICS & GYNECOLOGY

## 2020-11-07 PROCEDURE — 6360000002 HC RX W HCPCS: Performed by: OBSTETRICS & GYNECOLOGY

## 2020-11-07 PROCEDURE — 1220000000 HC SEMI PRIVATE OB R&B

## 2020-11-07 RX ORDER — PSEUDOEPHEDRINE HCL 30 MG
100 TABLET ORAL 2 TIMES DAILY
Qty: 14 CAPSULE | Refills: 0 | Status: SHIPPED | OUTPATIENT
Start: 2020-11-08 | End: 2020-11-15

## 2020-11-07 RX ORDER — IBUPROFEN 800 MG/1
800 TABLET ORAL EVERY 8 HOURS PRN
Qty: 21 TABLET | Refills: 0 | Status: SHIPPED | OUTPATIENT
Start: 2020-11-07 | End: 2022-09-22

## 2020-11-07 RX ORDER — OXYCODONE HYDROCHLORIDE AND ACETAMINOPHEN 5; 325 MG/1; MG/1
1 TABLET ORAL EVERY 8 HOURS PRN
Qty: 21 TABLET | Refills: 0 | Status: SHIPPED | OUTPATIENT
Start: 2020-11-07 | End: 2020-11-14

## 2020-11-07 RX ADMIN — ENOXAPARIN SODIUM 40 MG: 40 INJECTION SUBCUTANEOUS at 21:40

## 2020-11-07 RX ADMIN — ENOXAPARIN SODIUM 40 MG: 40 INJECTION SUBCUTANEOUS at 02:40

## 2020-11-07 RX ADMIN — Medication 10 ML: at 22:53

## 2020-11-07 RX ADMIN — KETOROLAC TROMETHAMINE 30 MG: 30 INJECTION, SOLUTION INTRAMUSCULAR at 02:40

## 2020-11-07 RX ADMIN — IBUPROFEN 800 MG: 400 TABLET, FILM COATED ORAL at 22:52

## 2020-11-07 RX ADMIN — IBUPROFEN 800 MG: 400 TABLET, FILM COATED ORAL at 14:51

## 2020-11-07 RX ADMIN — DOCUSATE SODIUM 100 MG: 100 CAPSULE, LIQUID FILLED ORAL at 21:40

## 2020-11-07 RX ADMIN — DOCUSATE SODIUM 100 MG: 100 CAPSULE, LIQUID FILLED ORAL at 08:23

## 2020-11-07 RX ADMIN — KETOROLAC TROMETHAMINE 30 MG: 30 INJECTION, SOLUTION INTRAMUSCULAR at 08:25

## 2020-11-07 ASSESSMENT — PAIN - FUNCTIONAL ASSESSMENT
PAIN_FUNCTIONAL_ASSESSMENT: ACTIVITIES ARE NOT PREVENTED

## 2020-11-07 ASSESSMENT — PAIN DESCRIPTION - LOCATION
LOCATION: ABDOMEN

## 2020-11-07 ASSESSMENT — PAIN SCALES - GENERAL
PAINLEVEL_OUTOF10: 1
PAINLEVEL_OUTOF10: 2
PAINLEVEL_OUTOF10: 2
PAINLEVEL_OUTOF10: 1
PAINLEVEL_OUTOF10: 1
PAINLEVEL_OUTOF10: 0
PAINLEVEL_OUTOF10: 3
PAINLEVEL_OUTOF10: 3
PAINLEVEL_OUTOF10: 4

## 2020-11-07 ASSESSMENT — PAIN DESCRIPTION - PROGRESSION
CLINICAL_PROGRESSION: GRADUALLY WORSENING
CLINICAL_PROGRESSION: GRADUALLY IMPROVING
CLINICAL_PROGRESSION: GRADUALLY IMPROVING
CLINICAL_PROGRESSION: GRADUALLY WORSENING
CLINICAL_PROGRESSION: GRADUALLY IMPROVING
CLINICAL_PROGRESSION: GRADUALLY WORSENING

## 2020-11-07 ASSESSMENT — PAIN DESCRIPTION - DESCRIPTORS
DESCRIPTORS: ACHING;SORE
DESCRIPTORS: DISCOMFORT;SORE
DESCRIPTORS: CRAMPING;SORE
DESCRIPTORS: ACHING;SORE
DESCRIPTORS: ACHING;SORE
DESCRIPTORS: DISCOMFORT;SORE

## 2020-11-07 ASSESSMENT — PAIN DESCRIPTION - ONSET
ONSET: GRADUAL
ONSET: GRADUAL
ONSET: ON-GOING
ONSET: GRADUAL
ONSET: GRADUAL
ONSET: ON-GOING

## 2020-11-07 ASSESSMENT — PAIN DESCRIPTION - ORIENTATION
ORIENTATION: LOWER

## 2020-11-07 ASSESSMENT — PAIN DESCRIPTION - PAIN TYPE
TYPE: SURGICAL PAIN

## 2020-11-07 ASSESSMENT — PAIN DESCRIPTION - FREQUENCY
FREQUENCY: INTERMITTENT

## 2020-11-07 NOTE — LACTATION NOTE
This note was copied from a baby's chart. LC to room. Mother states infant is breastfeeding well, denies any pain/discomfort with latching/positioning at this time. LC reviewed hand expression, skin to skin and attempting every 2-3 hours or sooner if infant shows cues. LC discussed cluster feeding in depth with mother, encouraged her to rest today between feedings. Mother states infant has been spitty, encouraged holding infant upright and doing skin to skin for both mother and FOB. Mother agreed. Mother states no further needs at this time. [facility] updated whiteboard with name and encouraged mother to call as needed today.

## 2020-11-07 NOTE — FLOWSHEET NOTE
Pt up as tolerated in room. Pt just returned to bed from bathroom with no complaints. Last dose of Toradol IV given per MAR. Reinforced process of ordering food and encouraged patient to order breakfast.  Morning assessment completed. No signs of distress at this time. Pt denies needs at this time.

## 2020-11-07 NOTE — LACTATION NOTE
This note was copied from a baby's chart. LC to room. Mother states infant has done well with breastfeeding, no pain/discomfort noted with latch/feedings. Mother states infant has been sleepy today, LC reviewed normal  behavior and cluster feeding tonight. Mother denies any further needs at this time.

## 2020-11-07 NOTE — FLOWSHEET NOTE
Mother tired today, not much sleep since delivery. Encouraged rest tonight. Breast feeding going well. Abdominal incision HUMERA with no noted drainage or no s/s of infection. VSS. Encouraged to increase activity and  ambulation as tolerated.

## 2020-11-08 PROCEDURE — 6360000002 HC RX W HCPCS: Performed by: OBSTETRICS & GYNECOLOGY

## 2020-11-08 PROCEDURE — 1220000000 HC SEMI PRIVATE OB R&B

## 2020-11-08 PROCEDURE — 6370000000 HC RX 637 (ALT 250 FOR IP): Performed by: OBSTETRICS & GYNECOLOGY

## 2020-11-08 RX ADMIN — IBUPROFEN 800 MG: 400 TABLET, FILM COATED ORAL at 16:41

## 2020-11-08 RX ADMIN — IBUPROFEN 800 MG: 400 TABLET, FILM COATED ORAL at 08:10

## 2020-11-08 RX ADMIN — DOCUSATE SODIUM 100 MG: 100 CAPSULE, LIQUID FILLED ORAL at 20:31

## 2020-11-08 RX ADMIN — ENOXAPARIN SODIUM 40 MG: 40 INJECTION SUBCUTANEOUS at 20:31

## 2020-11-08 ASSESSMENT — PAIN SCALES - GENERAL
PAINLEVEL_OUTOF10: 3
PAINLEVEL_OUTOF10: 4

## 2020-11-08 NOTE — FLOWSHEET NOTE
Mother doing well, was able to get a little sleep today. Breastfeeding going well, has some right nipple discomfort when infant latches. Abdominal incision HUMERA no drainage or s/s of infection noted. VSS. Bleeding minimal.  Voiding w/o difficulty. Plan for d/c Monday.

## 2020-11-08 NOTE — DISCHARGE SUMMARY
Department of Obstetrics and Gynecology  Postpartum Discharge Summary      Admit Date: 2020    Admit Diagnosis: S/P primary low transverse  [Z98.891]  Term pregnancy [Z34.90]    Discharge Date: 2020    Discharge Diagnoses: primary elective C section @ 39 3/7, AMA,  IVF pregnancy      Yessy Camps   Home Medication Instructions [id number]    Printed on:20 0909   Medication Information                      docusate sodium (COLACE, DULCOLAX) 100 MG CAPS  Take 100 mg by mouth 2 times daily for 7 days             ibuprofen (ADVIL;MOTRIN) 800 MG tablet  Take 1 tablet by mouth every 8 hours as needed for Pain             oxyCODONE-acetaminophen (PERCOCET) 5-325 MG per tablet  Take 1 tablet by mouth every 8 hours as needed for Pain for up to 7 days. Prenatal MV-Min-Fe Fum-FA-DHA (PRENATAL 1 PO)  Take 1 tablet by mouth daily                 Service: Obstetrics    Consults: none    Significant Diagnostic Studies: none    Postpartum complications: none     Condition at Discharge: good    Hospital Course: uncomplicated    Discharge Instructions: Activity: as tolerated    Diet: regular diet    Instructions: No intercourse and nothing in the vagina for 6 weeks. Do not drive while using pain medications.  Keep any wounds clean and dry    Discharge to: Home    Disposition / Follow up: Return to office in 6 weeks    Home Health Nurse visit within 24-48 h if qualifies    [place] Data:  Weight   Information for the patient's :  Saundra Abernathy [[id number]]        [employer]   Information for the patient's :  John Esquivel Boy [address] with mother    Electronically signed by Rhonda Estrada MD on 2020 at 9:09 AM

## 2020-11-08 NOTE — FLOWSHEET NOTE
RN witness infant latched to breast. Pt denies any biting or pinching. Pt reports pulling and tugging.

## 2020-11-08 NOTE — PROGRESS NOTES
Department of Obstetrics and Gynecology   Postpartum Rounds    SUBJECTIVE:  Pain is controlled with non-steroidal anti-inflammatory drugs or narcotic analgesics. The patient is tolerating regular diet. She is ambulating. Her lochia is normal.    OBJECTIVE:  Vital Signs: /71   Pulse 100   Temp 98.2 °F (36.8 °C) (Oral)   Resp 18   Ht 5' 7\" (1.702 m)   Wt 216 lb (98 kg)   SpO2 96%   Breastfeeding Unknown   BMI 33.83 kg/m²   Appearance/Psychiatric: awake, alert, cooperative, no apparent distress, appears stated age  Constitutional: The patient is well nourished. Cardiovascular: She does not have edema. Respiratory: Respiratory effort is normal.  Gastrointestinal: Soft, appropriately tender, uterine fundus is firm below umbilicus  The incision is clean, dry and intact  Extremities: nontender to palpation    LABS / IMAGING:  CBC:   Lab Results   Component Value Date    WBC 11.2 2020    RBC 3.15 2020    HGB 9.2 2020    HCT 27.7 2020    MCV 88.0 2020    MCH 29.2 2020    MCHC 33.2 2020    RDW 13.6 2020     2020    MPV 7.2 2020       ASSESSMENT:    Postoperative Day 2 s/p Planned Primary      PLAN:   1. Advance postoperative care as tolerated  2. Discharge home on Postpartum Day 3. Mom declined circumcision   3. Return to office in 6 weeks   4.  Postpartum instructions reviewed and all patient's Questions answered    Electronically signed by Tammi Moreira MD on 2020 at 9:08 AM

## 2020-11-08 NOTE — LACTATION NOTE
This note was copied from a baby's chart. LC to room. Mother states breastfeeding going ok, right nipple is sore and has some damage, but infant feeding well. LC reinforced importance of positioning infant nose to nipple, belly to belly, waiting for wide open mouth, and bringing baby onto breast to ensure a deep latch. LC gave lanolin and instructed mother in use and increased risk of yeast infection. Discussed allowing drops of expressed milk/colostrum to air-dry on breast before applying a teardrop of lanolin to the damaged area only. LC gave breast shells to wear between feedings to aide in healing nipples. LC encouraged mother to call for feeding on right breast to observe latch and offer support as needed. Mother agreed and denies any needs at this time.

## 2020-11-08 NOTE — PLAN OF CARE
Problem: Discharge Planning:  Goal: Discharged to appropriate level of care  Description: Discharged to appropriate level of care  11/7/2020 2336 by Danny Becker RN  Outcome: Ongoing  11/7/2020 1621 by Shreyas Kovacs RN  Outcome: Ongoing     Problem: Fluid Volume - Imbalance:  Goal: Absence of postpartum hemorrhage signs and symptoms  Description: Absence of postpartum hemorrhage signs and symptoms  11/7/2020 2336 by Danny Becker RN  Outcome: Ongoing  11/7/2020 1621 by Shreyas Kovacs RN  Outcome: Ongoing  Goal: Absence of imbalanced fluid volume signs and symptoms  Description: Absence of imbalanced fluid volume signs and symptoms  11/7/2020 2336 by Danny Becker RN  Outcome: Ongoing  11/7/2020 1621 by Shreyas Kovacs RN  Outcome: Ongoing     Problem: Infection - Surgical Site:  Goal: Will show no infection signs and symptoms  Description: Will show no infection signs and symptoms  11/7/2020 2336 by Danny Becker RN  Outcome: Ongoing  11/7/2020 1621 by Shreyas Kovacs RN  Outcome: Ongoing     Problem: Mood - Altered:  Goal: Mood stable  Description: Mood stable  11/7/2020 2336 by Danny Becker RN  Outcome: Ongoing  11/7/2020 1621 by Shreyas Kovacs RN  Outcome: Ongoing     Problem: Nausea/Vomiting:  Goal: Absence of nausea/vomiting  Description: Absence of nausea/vomiting  11/7/2020 2336 by Danny Becker RN  Outcome: Ongoing  11/7/2020 1621 by Shreyas Kovacs RN  Outcome: Ongoing     Problem: Pain - Acute:  Goal: Pain level will decrease  Description: Pain level will decrease  11/7/2020 2336 by Danny Becker RN  Outcome: Ongoing  11/7/2020 1621 by Shreyas Kovacs RN  Outcome: Ongoing     Problem: Urinary Retention:  Goal: Urinary elimination within specified parameters  Description: Urinary elimination within specified parameters  11/7/2020 2336 by Danny Becker RN  Outcome: Ongoing  11/7/2020 1621 by Shreyas Kovacs RN  Outcome: Ongoing     Problem: Venous

## 2020-11-08 NOTE — FLOWSHEET NOTE
Bedside handoff completed. All questions answered. Orders reviewed. Patient included in discussion regarding plan of care. Pt denies pain. Report received from D. Merleen Kocher.

## 2020-11-09 VITALS
HEIGHT: 67 IN | WEIGHT: 216 LBS | RESPIRATION RATE: 16 BRPM | SYSTOLIC BLOOD PRESSURE: 106 MMHG | DIASTOLIC BLOOD PRESSURE: 71 MMHG | HEART RATE: 79 BPM | TEMPERATURE: 97.8 F | BODY MASS INDEX: 33.9 KG/M2 | OXYGEN SATURATION: 96 %

## 2020-11-09 PROCEDURE — 6370000000 HC RX 637 (ALT 250 FOR IP): Performed by: OBSTETRICS & GYNECOLOGY

## 2020-11-09 RX ADMIN — IBUPROFEN 800 MG: 400 TABLET, FILM COATED ORAL at 10:25

## 2020-11-09 RX ADMIN — DOCUSATE SODIUM 100 MG: 100 CAPSULE, LIQUID FILLED ORAL at 08:29

## 2020-11-09 RX ADMIN — IBUPROFEN 800 MG: 400 TABLET, FILM COATED ORAL at 01:45

## 2020-11-09 ASSESSMENT — PAIN - FUNCTIONAL ASSESSMENT
PAIN_FUNCTIONAL_ASSESSMENT: ACTIVITIES ARE NOT PREVENTED

## 2020-11-09 ASSESSMENT — PAIN DESCRIPTION - ONSET
ONSET: GRADUAL

## 2020-11-09 ASSESSMENT — PAIN DESCRIPTION - DESCRIPTORS
DESCRIPTORS: SORE
DESCRIPTORS: ACHING;HEADACHE
DESCRIPTORS: SORE
DESCRIPTORS: ACHING;HEADACHE

## 2020-11-09 ASSESSMENT — PAIN SCALES - GENERAL
PAINLEVEL_OUTOF10: 2
PAINLEVEL_OUTOF10: 3
PAINLEVEL_OUTOF10: 2

## 2020-11-09 ASSESSMENT — PAIN DESCRIPTION - PAIN TYPE
TYPE: SURGICAL PAIN
TYPE: ACUTE PAIN;SURGICAL PAIN
TYPE: SURGICAL PAIN

## 2020-11-09 ASSESSMENT — PAIN DESCRIPTION - LOCATION
LOCATION: ABDOMEN
LOCATION: ABDOMEN;HEAD
LOCATION: ABDOMEN

## 2020-11-09 ASSESSMENT — PAIN DESCRIPTION - FREQUENCY
FREQUENCY: INTERMITTENT

## 2020-11-09 ASSESSMENT — PAIN DESCRIPTION - ORIENTATION
ORIENTATION: LOWER
ORIENTATION: MID;LOWER
ORIENTATION: MID;LOWER

## 2020-11-09 ASSESSMENT — PAIN DESCRIPTION - PROGRESSION
CLINICAL_PROGRESSION: GRADUALLY WORSENING
CLINICAL_PROGRESSION: NOT CHANGED
CLINICAL_PROGRESSION: NOT CHANGED

## 2020-11-09 NOTE — FLOWSHEET NOTE
Patient awake and alert in bed holding infant skin to skin. Fundus firm midline at U/-1. Bleeding consist of light period per patient. Swelling noted in lower extremities. Encouraged patient to keep legs elevated when sitting down. Patient stated understanding. Name and number updated on whiteboard.

## 2020-11-09 NOTE — LACTATION NOTE
This note was copied from a baby's chart. LC to room. Mother states breastfeeding going well. Discussed weight loss, feeding and output expectations, and reverse pressure softening. I  reviewed hand out for reverse pressure softening. I taught and mother returned demo for improving latch when engorged. Encouraged use of other comfort measures including applying cold packs for 15-20 minutes after feedings 2-3 times per day, NSAIDs as prescribed and hand expression when necessary. Encouraged mother to continue feeding infant on demand whenever cues are shown and at least 8 times per 24 hours. Wrote name and number on white board and encouraged mother to call with any lactation needs.

## 2020-11-09 NOTE — PLAN OF CARE
Problem: Fluid Volume - Imbalance:  Goal: Absence of postpartum hemorrhage signs and symptoms  Description: Absence of postpartum hemorrhage signs and symptoms  Outcome: Met This Shift  Goal: Absence of imbalanced fluid volume signs and symptoms  Description: Absence of imbalanced fluid volume signs and symptoms  Outcome: Met This Shift     Problem: Infection - Surgical Site:  Goal: Will show no infection signs and symptoms  Description: Will show no infection signs and symptoms  Outcome: Met This Shift     Problem: Mood - Altered:  Goal: Mood stable  Description: Mood stable  Outcome: Met This Shift     Problem: Nausea/Vomiting:  Goal: Absence of nausea/vomiting  Description: Absence of nausea/vomiting  Outcome: Met This Shift     Problem: Urinary Retention:  Goal: Urinary elimination within specified parameters  Description: Urinary elimination within specified parameters  Outcome: Met This Shift     Problem: Venous Thromboembolism:  Goal: Will show no signs or symptoms of venous thromboembolism  Description: Will show no signs or symptoms of venous thromboembolism  Outcome: Met This Shift  Goal: Absence of signs or symptoms of impaired coagulation  Description: Absence of signs or symptoms of impaired coagulation  Outcome: Met This Shift

## 2020-11-09 NOTE — FLOWSHEET NOTE

## 2020-11-09 NOTE — PLAN OF CARE
Problem: Discharge Planning:  Goal: Discharged to appropriate level of care  Description: Discharged to appropriate level of care  11/9/2020 1242 by Patrick Pierson RN  Outcome: Completed  11/9/2020 0426 by Cody Tao RN  Outcome: Ongoing     Problem: Fluid Volume - Imbalance:  Goal: Absence of postpartum hemorrhage signs and symptoms  Description: Absence of postpartum hemorrhage signs and symptoms  11/9/2020 1242 by Patrick Pierson RN  Outcome: Completed  11/9/2020 0426 by Cody Tao RN  Outcome: Met This Shift  Goal: Absence of imbalanced fluid volume signs and symptoms  Description: Absence of imbalanced fluid volume signs and symptoms  11/9/2020 1242 by Patrick Pierson RN  Outcome: Completed  11/9/2020 0426 by Cody Tao RN  Outcome: Met This Shift     Problem: Infection - Surgical Site:  Goal: Will show no infection signs and symptoms  Description: Will show no infection signs and symptoms  11/9/2020 1242 by Patrick Pierson RN  Outcome: Completed  11/9/2020 0426 by Cody Tao RN  Outcome: Met This Shift     Problem: Mood - Altered:  Goal: Mood stable  Description: Mood stable  11/9/2020 1242 by Patrick Pierson RN  Outcome: Completed  11/9/2020 0426 by Cody Tao RN  Outcome: Met This Shift     Problem: Nausea/Vomiting:  Goal: Absence of nausea/vomiting  Description: Absence of nausea/vomiting  11/9/2020 1242 by Patrick Pierson RN  Outcome: Completed  11/9/2020 0426 by Cody Tao RN  Outcome: Met This Shift     Problem: Pain - Acute:  Goal: Pain level will decrease  Description: Pain level will decrease  11/9/2020 1242 by Patrick Pierson RN  Outcome: Completed  11/9/2020 0426 by Cody Tao RN  Outcome: Ongoing     Problem: Urinary Retention:  Goal: Urinary elimination within specified parameters  Description: Urinary elimination within specified parameters  11/9/2020 1242 by Patrick Pierson RN  Outcome: Completed  11/9/2020 0426 by Cody Tao RN  Outcome: Met This Shift     Problem: Venous Thromboembolism:  Goal: Will show no signs or symptoms of venous thromboembolism  Description: Will show no signs or symptoms of venous thromboembolism  11/9/2020 1242 by Nirali Payne RN  Outcome: Completed  11/9/2020 0426 by Danuta Ferraro RN  Outcome: Met This Shift  Goal: Absence of signs or symptoms of impaired coagulation  Description: Absence of signs or symptoms of impaired coagulation  11/9/2020 1242 by Nirali Payne RN  Outcome: Completed  11/9/2020 0426 by Danuta Ferraro RN  Outcome: Met This Shift     Problem: Pain:  Description: Pain management should include both nonpharmacologic and pharmacologic interventions.   Goal: Pain level will decrease  Description: Pain level will decrease  11/9/2020 1242 by Nirali Payne RN  Outcome: Completed  11/9/2020 0426 by Danuta Ferraro RN  Outcome: Ongoing  Goal: Control of acute pain  Description: Control of acute pain  11/9/2020 1242 by Nirali Payne RN  Outcome: Completed  11/9/2020 0426 by Danuta Ferraro RN  Outcome: Ongoing  Goal: Control of chronic pain  Description: Control of chronic pain  Outcome: Completed

## 2021-04-29 ENCOUNTER — TELEPHONE (OUTPATIENT)
Dept: PRIMARY CARE CLINIC | Age: 46
End: 2021-04-29

## 2021-04-29 ENCOUNTER — NURSE TRIAGE (OUTPATIENT)
Dept: OTHER | Facility: CLINIC | Age: 46
End: 2021-04-29

## 2021-04-29 ENCOUNTER — OFFICE VISIT (OUTPATIENT)
Dept: PRIMARY CARE CLINIC | Age: 46
End: 2021-04-29
Payer: COMMERCIAL

## 2021-04-29 VITALS
BODY MASS INDEX: 29.67 KG/M2 | HEART RATE: 82 BPM | RESPIRATION RATE: 16 BRPM | HEIGHT: 66 IN | WEIGHT: 184.6 LBS | DIASTOLIC BLOOD PRESSURE: 65 MMHG | TEMPERATURE: 98.2 F | SYSTOLIC BLOOD PRESSURE: 109 MMHG

## 2021-04-29 DIAGNOSIS — Z76.89 ENCOUNTER TO ESTABLISH CARE: Primary | ICD-10-CM

## 2021-04-29 DIAGNOSIS — Z78.9 HEALTH MAINTENANCE ALTERATION: ICD-10-CM

## 2021-04-29 DIAGNOSIS — R10.11 RUQ PAIN: ICD-10-CM

## 2021-04-29 LAB
A/G RATIO: 1.8 (ref 1.1–2.2)
ALBUMIN SERPL-MCNC: 4.5 G/DL (ref 3.4–5)
ALP BLD-CCNC: 78 U/L (ref 40–129)
ALT SERPL-CCNC: 21 U/L (ref 10–40)
AMYLASE: 56 U/L (ref 25–115)
ANION GAP SERPL CALCULATED.3IONS-SCNC: 12 MMOL/L (ref 3–16)
AST SERPL-CCNC: 18 U/L (ref 15–37)
BILIRUB SERPL-MCNC: <0.2 MG/DL (ref 0–1)
BILIRUBIN, POC: NEGATIVE
BLOOD URINE, POC: NEGATIVE
BUN BLDV-MCNC: 10 MG/DL (ref 7–20)
CALCIUM SERPL-MCNC: 8.9 MG/DL (ref 8.3–10.6)
CHLORIDE BLD-SCNC: 103 MMOL/L (ref 99–110)
CHOLESTEROL, TOTAL: 165 MG/DL (ref 0–199)
CLARITY, POC: CLEAR
CO2: 25 MMOL/L (ref 21–32)
COLOR, POC: NORMAL
CREAT SERPL-MCNC: 0.9 MG/DL (ref 0.6–1.1)
GFR AFRICAN AMERICAN: >60
GFR NON-AFRICAN AMERICAN: >60
GLOBULIN: 2.5 G/DL
GLUCOSE BLD-MCNC: 78 MG/DL (ref 70–99)
GLUCOSE URINE, POC: NEGATIVE
HDLC SERPL-MCNC: 62 MG/DL (ref 40–60)
KETONES, POC: NEGATIVE
LDL CHOLESTEROL CALCULATED: 83 MG/DL
LEUKOCYTE EST, POC: NEGATIVE
LIPASE: 64 U/L (ref 13–60)
NITRITE, POC: NEGATIVE
PH, POC: 6
POTASSIUM SERPL-SCNC: 4.7 MMOL/L (ref 3.5–5.1)
PROTEIN, POC: NEGATIVE
SODIUM BLD-SCNC: 140 MMOL/L (ref 136–145)
SPECIFIC GRAVITY, POC: 1.01
TOTAL PROTEIN: 7 G/DL (ref 6.4–8.2)
TRIGL SERPL-MCNC: 98 MG/DL (ref 0–150)
UROBILINOGEN, POC: 0.2
VLDLC SERPL CALC-MCNC: 20 MG/DL

## 2021-04-29 PROCEDURE — 99204 OFFICE O/P NEW MOD 45 MIN: CPT | Performed by: FAMILY MEDICINE

## 2021-04-29 PROCEDURE — 81002 URINALYSIS NONAUTO W/O SCOPE: CPT | Performed by: FAMILY MEDICINE

## 2021-04-29 ASSESSMENT — PATIENT HEALTH QUESTIONNAIRE - PHQ9
SUM OF ALL RESPONSES TO PHQ QUESTIONS 1-9: 0
SUM OF ALL RESPONSES TO PHQ9 QUESTIONS 1 & 2: 0

## 2021-04-29 ASSESSMENT — ENCOUNTER SYMPTOMS
CONSTIPATION: 0
ABDOMINAL PAIN: 1
COUGH: 0
SHORTNESS OF BREATH: 0
NAUSEA: 0
VOMITING: 0
WHEEZING: 0
CHEST TIGHTNESS: 0
BLOOD IN STOOL: 0
SORE THROAT: 0
RHINORRHEA: 0
BACK PAIN: 0
SINUS PRESSURE: 0
SINUS PAIN: 0
DIARRHEA: 0

## 2021-04-29 NOTE — TELEPHONE ENCOUNTER
This was not a n/s,  appointment not cancelled by call center, pt was seen today in [address] needs to be cancelled

## 2021-04-29 NOTE — TELEPHONE ENCOUNTER
Patient called [facility] with red flag complaint to establish care with Solis Hairston. Brief description of triage: middle and right side abdominal pain x 3 days    Triage indicates for patient to be seen today    Care advice provided, patient verbalizes understanding; denies any other questions or concerns; instructed to call back for any new or worsening symptoms. Writer provided warm transfer to [facility] at [facility] for appointment scheduling. Attention Provider: Thank you for allowing me to participate in the care of your patient. The patient was connected to triage in response to information provided to the [facility]. Please do not respond through this encounter as the response is not directed to a shared pool. Reason for Disposition   MODERATE OR MILD pain that comes and goes (cramps) lasts > 24 hours    Answer Assessment - Initial Assessment Questions  1. LOCATION: \"Where does it hurt? \"       Middle right side of abdomen    2. RADIATION: \"Does the pain shoot anywhere else? \" (e.g., chest, back)      May shoot to back a little bit    3. ONSET: \"When did the pain begin? \" (e.g., minutes, hours or days ago)       3 days    4. SUDDEN: \"Gradual or sudden onset? \"      Gradually    5. PATTERN \"Does the pain come and go, or is it constant? \"     - If constant: \"Is it getting better, staying the same, or worsening? \"       (Note: Constant means the pain never goes away completely; most serious pain is constant and it progresses)      - If intermittent: \"How long does it last?\" \"Do you have pain now? \"      (Note: Intermittent means the pain goes away completely between bouts)      Comes and goes     6. SEVERITY: \"How bad is the pain? \"  (e.g., Scale 1-10; mild, moderate, or severe)    - MILD (1-3): doesn't interfere with normal activities, abdomen soft and not tender to touch     - MODERATE (4-7): interferes with normal activities or awakens from sleep, tender to touch     - SEVERE (8-10): excruciating pain, doubled over, unable to do any normal activities       4/10, uncomfortable    7. RECURRENT SYMPTOM: \"Have you ever had this type of abdominal pain before? \" If so, ask: \"When was the last time? \" and \"What happened that time? \"       Had gall bladder removed    8. CAUSE: \"What do you think is causing the abdominal pain? \"      Unknown    9. RELIEVING/AGGRAVATING FACTORS: \"What makes it better or worse? \" (e.g., movement, antacids, bowel movement)      Lying on back or left side helps    10. OTHER SYMPTOMS: \"Has there been any vomiting, diarrhea, constipation, or urine problems? \"        Urine has odor but no other symptoms    11. PREGNANCY: \"Is there any chance you are pregnant? \" \"When was your last menstrual period? \"        No    Protocols used: ABDOMINAL PAIN - FEMALE-ADULT-[place]

## 2021-04-29 NOTE — PROGRESS NOTES
Chief Complaint   Patient presents with   Sharlene Jeter Establish Care    Flank Pain     started 3 days ago. feels like a dall ache. uncomfortable to lay on no other sx          HPI:  Bruno Anne is a 39 y.o. (: 1975) here today to establish care for right sided discomfort. Thought it was a muscle pain but has progressively worsened. Constant. Not dependant on movement. Day 3 with this. Narrowed it to the RUQ and around to side. No constipation, nausea or dysuria. No back pain. No stomach pain or tenderness elsewhere. Does state she has acid reflux that has been acting up lately. No radiation of pain, rates it about 3-4 out of 10. History of cholecystectomy. States it feels like gas pain she has sometimes. Review of Systems   Constitutional: Negative for activity change, appetite change, chills, fatigue, fever and unexpected weight change. HENT: Negative for congestion, postnasal drip, rhinorrhea, sinus pressure, sinus pain, sneezing and sore throat. Eyes: Negative for visual disturbance. Respiratory: Negative for cough, chest tightness, shortness of breath and wheezing. Cardiovascular: Negative for chest pain and palpitations. Gastrointestinal: Positive for abdominal pain. Negative for blood in stool, constipation, diarrhea, nausea and vomiting. Endocrine: Negative for cold intolerance, heat intolerance, polydipsia and polyuria. Genitourinary: Negative for dysuria, frequency, vaginal bleeding and vaginal discharge. Musculoskeletal: Negative for arthralgias, back pain, joint swelling, myalgias and neck pain. Skin: Negative for rash and wound. Allergic/Immunologic: Negative for environmental allergies. Neurological: Negative for dizziness, tremors, syncope, weakness, light-headedness, numbness and headaches. Hematological: Negative for adenopathy. Psychiatric/Behavioral: Negative for behavioral problems, decreased concentration, sleep disturbance and suicidal ideas.  The patient is not nervous/anxious. Past Medical History:   Diagnosis Date    Anesthesia complication     slow to wake up    Cholecystitis with cholelithiasis 2/10/2014    Gall stones     IBS (irritable bowel syndrome)     In vitro fertilization     Infertility, female     Mononucleosis     UTI (lower urinary tract infection) 4weeks ago       Family History   Problem Relation Age of Onset    Breast Cancer Maternal Grandmother 44    Diabetes Mother     Hypertension Mother     Mult Sclerosis Father        Social History     Tobacco Use    Smoking status: Never Smoker    Smokeless tobacco: Never Used   Substance Use Topics    Alcohol use: Not Currently     Alcohol/week: 2.0 standard drinks     Types: 2 Glasses of wine per week     Comment: social    Drug use: No       New Prescriptions    No medications on file       Meds Prior to visit:  Current Outpatient Medications on File Prior to Visit   Medication Sig Dispense Refill    ibuprofen (ADVIL;MOTRIN) 800 MG tablet Take 1 tablet by mouth every 8 hours as needed for Pain 21 tablet 0    Prenatal MV-Min-Fe Fum-FA-DHA (PRENATAL 1 PO) Take 1 tablet by mouth daily       No current facility-administered medications on file prior to visit. No Known Allergies    OBJECTIVE:  /65   Pulse 82   Temp 98.2 °F (36.8 °C) (Temporal)   Resp 16   Ht 5' 6\" (1.676 m)   Wt 184 lb 9.6 oz (83.7 kg)   Breastfeeding No   BMI 29.80 kg/m²   BP Readings from Last 2 Encounters:   04/29/21 109/65   11/09/20 106/71     Wt Readings from Last 3 Encounters:   04/29/21 184 lb 9.6 oz (83.7 kg)   11/06/20 216 lb (98 kg)   09/11/20 202 lb (91.6 kg)       Physical Exam  Vitals signs reviewed. Constitutional:       General: She is not in acute distress. Appearance: She is well-developed. HENT:      Head: Normocephalic and atraumatic. Right Ear: Tympanic membrane, ear canal and external ear normal. There is no impacted cerumen.       Left Ear: Tympanic membrane, ear canal and 11/07/2020    MCV 88.0 11/07/2020     11/07/2020     Lab Results   Component Value Date     02/08/2014    K 3.7 02/08/2014     02/08/2014    CO2 23 02/08/2014    BUN 9 02/08/2014    CREATININE 0.6 02/08/2014    GLUCOSE 133 (H) 02/08/2014    CALCIUM 9.1 02/08/2014    PROT 7.3 02/08/2014    LABALBU 4.4 02/08/2014    BILITOT 0.2 02/08/2014    ALKPHOS 47 02/08/2014    AST 25 02/08/2014    ALT 24 02/08/2014    LABGLOM >60 02/08/2014    GFRAA >60 02/08/2014    AGRATIO 1.5 02/08/2014    GLOB 2.9 02/08/2014     No results found for: CHOL  No results found for: TRIG  No results found for: HDL  No results found for: LDLCHOLESTEROL, LDLCALC  No results found for: LABVLDL, VLDL  No results found for: LABA1C      ASSESSMENT/PLAN:  1. Encounter to establish care  VS reviewed and WNL    BMI reviewed   All questions answered. F/u discussed. Healthy lifestyle modifications discussed. 2. RUQ pain  Rule out GERD versus gastroenteritis/gastritis versus liver or pancreas abnormality. Follow-up labs. Urine looks completely normal.  May consider treating with Protonix trial if labs are normal  History of cholecystitis and cholecystectomy  Discomfort is somewhat reproducible on exam when feeling ribs, may be musculoskeletal  - LIPASE; Future  - AMYLASE; Future  - Comprehensive Metabolic Panel; Future  - HEPATITIS C ANTIBODY; Future  - POCT Urinalysis no Micro  - CBC Auto Differential; Future    3. Health maintenance alteration  Update and follow-up  - HEPATITIS C ANTIBODY; Future  - Lipid Panel; Future  - HEMOGLOBIN A1C; Future        Discussed use, benefit, and side effects of prescribed medications. Barriers to medication compliance addressed. All patient questions answered. Pt voiced understanding. RTC No follow-ups on file. No future appointments.     Shawna Martinez MD  4/29/2021  3:47 PM

## 2021-04-30 ENCOUNTER — PATIENT MESSAGE (OUTPATIENT)
Dept: PRIMARY CARE CLINIC | Age: 46
End: 2021-04-30

## 2021-04-30 DIAGNOSIS — R10.11 RUQ PAIN: Primary | ICD-10-CM

## 2021-04-30 LAB
ESTIMATED AVERAGE GLUCOSE: 111.2 MG/DL
HBA1C MFR BLD: 5.5 %
HEPATITIS C ANTIBODY INTERPRETATION: NORMAL

## 2021-05-01 ENCOUNTER — HOSPITAL ENCOUNTER (OUTPATIENT)
Dept: ULTRASOUND IMAGING | Age: 46
Discharge: HOME | End: 2021-05-01
Payer: COMMERCIAL

## 2021-05-01 DIAGNOSIS — R10.11 RUQ PAIN: ICD-10-CM

## 2021-05-01 PROCEDURE — 76705 ECHO EXAM OF ABDOMEN: CPT

## 2021-05-04 NOTE — TELEPHONE ENCOUNTER
Patient wants someone to review her ultrasound results. Doesn't want to wait until Dr Sabi Arciniega gets back.

## 2021-05-04 NOTE — TELEPHONE ENCOUNTER
From: Thalia Huston  Sent: 5/4/2021 12:54 PM EDT  To: Lori Bowden Clinical Staff  Subject: RE:Hi Kenney Seip! Thanks much for letting me know. Is there someone else who could take a peek at my test results before then, by chance?      Thank you!  ~ Yassine

## 2021-08-16 ENCOUNTER — E-VISIT (OUTPATIENT)
Dept: INTERNAL MEDICINE CLINIC | Age: 46
End: 2021-08-16
Payer: COMMERCIAL

## 2021-08-16 DIAGNOSIS — L25.5 PLANT DERMATITIS: Primary | ICD-10-CM

## 2021-08-16 PROCEDURE — 98970 NQHP OL DIG ASSMT&MGMT 5-10: CPT | Performed by: NURSE PRACTITIONER

## 2021-08-16 RX ORDER — PREDNISONE 10 MG/1
10 TABLET ORAL 2 TIMES DAILY
Qty: 10 TABLET | Refills: 0 | Status: SHIPPED | OUTPATIENT
Start: 2021-08-16 | End: 2021-08-21

## 2022-01-29 NOTE — PLAN OF CARE
Problem: Discharge Planning:  Goal: Discharged to appropriate level of care  Description: Discharged to appropriate level of care  11/7/2020 1621 by Tomas Wong RN  Outcome: Ongoing  11/7/2020 0509 by Demetrius Holland RN  Outcome: Ongoing     Problem: Fluid Volume - Imbalance:  Goal: Absence of postpartum hemorrhage signs and symptoms  Description: Absence of postpartum hemorrhage signs and symptoms  11/7/2020 1621 by Tomas Wong RN  Outcome: Ongoing  11/7/2020 0509 by Demetrius Holland RN  Outcome: Ongoing  Goal: Absence of imbalanced fluid volume signs and symptoms  Description: Absence of imbalanced fluid volume signs and symptoms  11/7/2020 1621 by Tomas Wong RN  Outcome: Ongoing  11/7/2020 0509 by Demetrius Holland RN  Outcome: Ongoing     Problem: Infection - Surgical Site:  Goal: Will show no infection signs and symptoms  Description: Will show no infection signs and symptoms  11/7/2020 1621 by Tomas Wong RN  Outcome: Ongoing  11/7/2020 0509 by Demertius Holland RN  Outcome: Ongoing     Problem: Mood - Altered:  Goal: Mood stable  Description: Mood stable  11/7/2020 1621 by Tomas Wong RN  Outcome: Ongoing  11/7/2020 0509 by Demetrius Holland RN  Outcome: Ongoing     Problem: Nausea/Vomiting:  Goal: Absence of nausea/vomiting  Description: Absence of nausea/vomiting  11/7/2020 1621 by Tomas Wong RN  Outcome: Ongoing  11/7/2020 0509 by Demetrius Holland RN  Outcome: Ongoing     Problem: Pain - Acute:  Goal: Pain level will decrease  Description: Pain level will decrease  11/7/2020 1621 by Tomas Wong RN  Outcome: Ongoing  11/7/2020 0509 by Demetrius Holland RN  Outcome: Ongoing     Problem: Urinary Retention:  Goal: Urinary elimination within specified parameters  Description: Urinary elimination within specified parameters  11/7/2020 1621 by Tomas Wong RN  Outcome: Ongoing  11/7/2020 0509 by Demetrius Holland RN  Outcome: Ongoing     Problem: Venous Thromboembolism:  Goal: Will show no signs or symptoms of venous thromboembolism  Description: Will show no signs or symptoms of venous thromboembolism  11/7/2020 1621 by Geovanna Rice RN  Outcome: Ongoing  11/7/2020 0509 by Sharon Foster RN  Outcome: Ongoing  Goal: Absence of signs or symptoms of impaired coagulation  Description: Absence of signs or symptoms of impaired coagulation  11/7/2020 1621 by Geovanna Rice RN  Outcome: Ongoing  11/7/2020 0509 by Sharon Foster RN  Outcome: Ongoing     Problem: Pain:  Description: Pain management should include both nonpharmacologic and pharmacologic interventions.   Goal: Pain level will decrease  Description: Pain level will decrease  11/7/2020 1621 by Geovanna Rice RN  Outcome: Ongoing  11/7/2020 0509 by Sharon Foster RN  Outcome: Ongoing  Goal: Control of acute pain  Description: Control of acute pain  Outcome: Ongoing  Goal: Control of chronic pain  Description: Control of chronic pain  Outcome: Ongoing Started first trimester

## 2022-06-15 ENCOUNTER — PATIENT MESSAGE (OUTPATIENT)
Dept: PRIMARY CARE CLINIC | Age: 47
End: 2022-06-15

## 2022-06-15 DIAGNOSIS — R21 RASH: Primary | ICD-10-CM

## 2022-06-15 RX ORDER — METHYLPREDNISOLONE 4 MG/1
TABLET ORAL
Qty: 1 KIT | Refills: 0 | Status: SHIPPED | OUTPATIENT
Start: 2022-06-15 | End: 2022-06-21

## 2022-06-15 NOTE — TELEPHONE ENCOUNTER
From: Dexter Fernandez  To: Dr. Orellana Bare: 6/15/2022 12:53 PM EDT  Subject: Boogie Cardoso from [address]! I wore a long-sleeved shirt and gloves when working in the yard over the weekend, but something still got me. It's similar to the bumps/blisters I had last year after working in that same area. I don't know if it's poison oak/ivy/etc., but it's relatively itchy, and blistering. More bumps have appeared since the weekend. I've tried twice to attach a photo, but both times I received a \"network issue\" message because it can't upload in this message. What should I put on this to help ease the itching and prevent it from spreading more?     Thank you!  ~ Drew Granado

## 2022-09-22 ENCOUNTER — OFFICE VISIT (OUTPATIENT)
Dept: PRIMARY CARE CLINIC | Age: 47
End: 2022-09-22
Payer: COMMERCIAL

## 2022-09-22 VITALS
WEIGHT: 161 LBS | DIASTOLIC BLOOD PRESSURE: 77 MMHG | BODY MASS INDEX: 25.88 KG/M2 | SYSTOLIC BLOOD PRESSURE: 111 MMHG | HEIGHT: 66 IN | HEART RATE: 87 BPM

## 2022-09-22 DIAGNOSIS — M25.512 ACUTE PAIN OF LEFT SHOULDER: ICD-10-CM

## 2022-09-22 DIAGNOSIS — H66.001 NON-RECURRENT ACUTE SUPPURATIVE OTITIS MEDIA OF RIGHT EAR WITHOUT SPONTANEOUS RUPTURE OF TYMPANIC MEMBRANE: Primary | ICD-10-CM

## 2022-09-22 DIAGNOSIS — J04.0 LARYNGITIS: ICD-10-CM

## 2022-09-22 PROCEDURE — 99214 OFFICE O/P EST MOD 30 MIN: CPT | Performed by: STUDENT IN AN ORGANIZED HEALTH CARE EDUCATION/TRAINING PROGRAM

## 2022-09-22 RX ORDER — AMOXICILLIN AND CLAVULANATE POTASSIUM 875; 125 MG/1; MG/1
1 TABLET, FILM COATED ORAL 2 TIMES DAILY
Qty: 14 TABLET | Refills: 0 | Status: SHIPPED | OUTPATIENT
Start: 2022-09-22 | End: 2022-09-29

## 2022-09-22 RX ORDER — METHYLPREDNISOLONE 4 MG/1
TABLET ORAL
Qty: 21 TABLET | Refills: 0 | Status: SHIPPED | OUTPATIENT
Start: 2022-09-22 | End: 2022-09-28

## 2022-09-22 ASSESSMENT — ENCOUNTER SYMPTOMS
WHEEZING: 0
SORE THROAT: 1
RHINORRHEA: 1
TROUBLE SWALLOWING: 0
NAUSEA: 0
SHORTNESS OF BREATH: 0
VOICE CHANGE: 1
SINUS PRESSURE: 0
EYE PAIN: 0
VOMITING: 0
SINUS PAIN: 0
COUGH: 1
DIARRHEA: 0

## 2022-09-22 NOTE — PROGRESS NOTES
[facility]  2022    Sasha Harvey (:  1975) is a 55 y.o. female, here for evaluation of the following medical concerns:    Chief Complaint   Patient presents with    Other     Congestion, sinus issues, 3 days ago body said her whole body is sore and her eye was pink, right ear clogged and painful, chills no fever very dry cough, shoulder pain left shoulder, fell 4 months ago         ASSESSMENT/ PLAN  1. Non-recurrent acute suppurative otitis media of right ear without spontaneous rupture of tympanic membrane  Uncontrolled, this is a new problem. Likely secondary bacterial infection following viral URI. Initiate antibiotic therapy and follow-up if persistent or worsening  - amoxicillin-clavulanate (AUGMENTIN) 875-125 MG per tablet; Take 1 tablet by mouth 2 times daily for 7 days  Dispense: 14 tablet; Refill: 0    2. Laryngitis  Uncontrolled, this is a new problem. Likely secondary to viral etiology. Flu test negative in the office, encouraged patient to check COVID testing at home. Initiate Medrol Dosepak, and follow-up if persistent or worsening cough-would consider chest x-ray at that time  - methylPREDNISolone (MEDROL DOSEPACK) 4 MG tablet; Take by mouth. Dispense: 21 tablet; Refill: 0    3. Acute pain of left shoulder  Uncontrolled, this is a new problem. Likely mechanical injury resulting rotator cuff sprain, cannot rule out partial tear. Recommend conservative therapy with rest/ice, she may experience improvement with Medrol Dosepak. If persistent or worsening when her illness resolves, recommend follow-up with Dr. Roshni Sears for further assessment-may benefit from imaging and/or physical therapy. Return if symptoms worsen or fail to improve. HPI  Patient presents today with concerns of cough, hoarseness, myalgias. She would also like to discuss left shoulder pain.     She states that her son recently started , and developed similar symptoms over the past couple weeks.  She is fully vaccinated, but has not taken a home COVID test.  Cough is nonproductive. She endorses pain in her right ear, no discharge or hearing loss.  + Sore throat. No sinus pressure, fever, chills, chest pain or shortness of breath. She also notes onset of left shoulder pain last week after she had picked up her son quickly. She denies previous history of shoulder injury. No radiation of the pain. It is located on the anterior lateral aspect of her deltoid. No swelling, numbness or weakness. She has not tried any medications for the pain. It does not wake her from sleep at night. ROS  Review of Systems   Constitutional:  Negative for activity change, appetite change, fatigue and fever. HENT:  Positive for congestion, ear pain, rhinorrhea, sore throat and voice change. Negative for hearing loss, sinus pressure, sinus pain, tinnitus and trouble swallowing. Eyes:  Negative for pain. Respiratory:  Positive for cough. Negative for shortness of breath and wheezing. Cardiovascular:  Negative for chest pain. Gastrointestinal:  Negative for diarrhea, nausea and vomiting. Genitourinary:  Negative for decreased urine volume. Musculoskeletal:  Positive for myalgias. Neurological:  Negative for headaches. HISTORIES  Current Outpatient Medications on File Prior to Visit   Medication Sig Dispense Refill    ibuprofen (ADVIL;MOTRIN) 800 MG tablet Take 1 tablet by mouth every 8 hours as needed for Pain 21 tablet 0    Prenatal MV-Min-Fe Fum-FA-DHA (PRENATAL 1 PO) Take 1 tablet by mouth daily       No current facility-administered medications on file prior to visit.       Past Medical History:   Diagnosis Date    Anesthesia complication     slow to wake up    Cholecystitis with cholelithiasis 2/10/2014    Gall stones     IBS (irritable bowel syndrome)     In vitro fertilization     Infertility, female     Mononucleosis     UTI (lower urinary tract infection) 4weeks ago     Patient Active less than 2 seconds. Coloration: Skin is not jaundiced or pale. Neurological:      Mental Status: She is alert. Psychiatric:         Mood and Affect: Mood normal.         Behavior: Behavior normal.       Ivette Martel DO    This dictation was generated by voice recognition computer software. Although all attempts are made to edit the dictation for accuracy, there may be errors in the transcription that are not intended.

## 2022-10-12 ENCOUNTER — NURSE TRIAGE (OUTPATIENT)
Dept: OTHER | Facility: CLINIC | Age: 47
End: 2022-10-12

## 2022-10-12 ENCOUNTER — HOSPITAL ENCOUNTER (EMERGENCY)
Age: 47
Discharge: HOME | End: 2022-10-12
Attending: EMERGENCY MEDICINE
Payer: COMMERCIAL

## 2022-10-12 VITALS
RESPIRATION RATE: 18 BRPM | HEART RATE: 78 BPM | WEIGHT: 163.8 LBS | TEMPERATURE: 98.5 F | SYSTOLIC BLOOD PRESSURE: 126 MMHG | BODY MASS INDEX: 26.44 KG/M2 | OXYGEN SATURATION: 100 % | DIASTOLIC BLOOD PRESSURE: 69 MMHG

## 2022-10-12 DIAGNOSIS — T75.4XXA ELECTROCUTION AND NONFATAL EFFECTS OF ELECTRIC CURRENT, INITIAL ENCOUNTER: Primary | ICD-10-CM

## 2022-10-12 PROCEDURE — 93005 ELECTROCARDIOGRAM TRACING: CPT | Performed by: EMERGENCY MEDICINE

## 2022-10-12 PROCEDURE — 99283 EMERGENCY DEPT VISIT LOW MDM: CPT

## 2022-10-12 ASSESSMENT — PAIN - FUNCTIONAL ASSESSMENT: PAIN_FUNCTIONAL_ASSESSMENT: NONE - DENIES PAIN

## 2022-10-12 ASSESSMENT — ENCOUNTER SYMPTOMS
RESPIRATORY NEGATIVE: 1
SHORTNESS OF BREATH: 0
VOMITING: 0
NAUSEA: 0
ABDOMINAL PAIN: 0
EYES NEGATIVE: 1
GASTROINTESTINAL NEGATIVE: 1
SORE THROAT: 0

## 2022-10-12 ASSESSMENT — PAIN SCALES - GENERAL: PAINLEVEL_OUTOF10: 0
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
## 2022-10-12 NOTE — ED NOTES
Discharge and education instructions reviewed. Patient verbalized understanding, teach-back successful. Patient denied questions at this time. No acute distress noted. Patient instructed to follow-up as noted - return to emergency department if symptoms worsen. Patient verbalized understanding. Discharged per EDMD with discharge instructions.         Lukas Floyd RN  10/12/22 5556
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
## 2022-10-13 LAB
EKG ATRIAL RATE: 79 BPM
EKG DIAGNOSIS: NORMAL
EKG P AXIS: 0 DEGREES
EKG P-R INTERVAL: 118 MS
EKG Q-T INTERVAL: 372 MS
EKG QRS DURATION: 80 MS
EKG QTC CALCULATION (BAZETT): 426 MS
EKG R AXIS: 34 DEGREES
EKG T AXIS: 22 DEGREES
EKG VENTRICULAR RATE: 79 BPM

## 2022-10-13 PROCEDURE — 93010 ELECTROCARDIOGRAM REPORT: CPT | Performed by: INTERNAL MEDICINE

## 2022-12-20 ENCOUNTER — HOSPITAL ENCOUNTER (OUTPATIENT)
Age: 47
Discharge: HOME | End: 2022-12-20
Payer: COMMERCIAL

## 2022-12-20 ENCOUNTER — HOSPITAL ENCOUNTER (OUTPATIENT)
Dept: GENERAL RADIOLOGY | Age: 47
Discharge: HOME | End: 2022-12-20
Payer: COMMERCIAL

## 2022-12-20 ENCOUNTER — TELEPHONE (OUTPATIENT)
Dept: PRIMARY CARE CLINIC | Age: 47
End: 2022-12-20

## 2022-12-20 ENCOUNTER — OFFICE VISIT (OUTPATIENT)
Dept: PRIMARY CARE CLINIC | Age: 47
End: 2022-12-20
Payer: COMMERCIAL

## 2022-12-20 VITALS
HEART RATE: 76 BPM | OXYGEN SATURATION: 100 % | BODY MASS INDEX: 26.2 KG/M2 | TEMPERATURE: 97.1 F | HEIGHT: 66 IN | SYSTOLIC BLOOD PRESSURE: 127 MMHG | WEIGHT: 163 LBS | DIASTOLIC BLOOD PRESSURE: 79 MMHG

## 2022-12-20 DIAGNOSIS — M25.512 ACUTE PAIN OF LEFT SHOULDER: ICD-10-CM

## 2022-12-20 DIAGNOSIS — M25.512 ACUTE PAIN OF LEFT SHOULDER: Primary | ICD-10-CM

## 2022-12-20 DIAGNOSIS — Z12.11 COLON CANCER SCREENING: ICD-10-CM

## 2022-12-20 DIAGNOSIS — M79.89 SOFT TISSUE MASS: ICD-10-CM

## 2022-12-20 PROBLEM — N88.2 CERVICAL STENOSIS (UTERINE CERVIX): Status: ACTIVE | Noted: 2019-06-25

## 2022-12-20 PROBLEM — N95.1 PERIMENOPAUSE: Status: ACTIVE | Noted: 2019-06-25

## 2022-12-20 PROBLEM — M75.01 ADHESIVE CAPSULITIS OF RIGHT SHOULDER: Status: ACTIVE | Noted: 2018-11-30

## 2022-12-20 PROBLEM — J30.2 SEASONAL ALLERGIES: Status: ACTIVE | Noted: 2022-12-20

## 2022-12-20 PROBLEM — M62.89 PELVIC FLOOR DYSFUNCTION: Status: ACTIVE | Noted: 2017-04-06

## 2022-12-20 PROBLEM — N39.3 SUI (STRESS URINARY INCONTINENCE, FEMALE): Status: ACTIVE | Noted: 2017-04-06

## 2022-12-20 PROBLEM — M75.01 ADHESIVE CAPSULITIS OF RIGHT SHOULDER: Status: RESOLVED | Noted: 2018-11-30 | Resolved: 2022-12-20

## 2022-12-20 PROCEDURE — 99214 OFFICE O/P EST MOD 30 MIN: CPT | Performed by: FAMILY MEDICINE

## 2022-12-20 PROCEDURE — 73030 X-RAY EXAM OF SHOULDER: CPT

## 2022-12-20 ASSESSMENT — PATIENT HEALTH QUESTIONNAIRE - PHQ9
SUM OF ALL RESPONSES TO PHQ QUESTIONS 1-9: 0
SUM OF ALL RESPONSES TO PHQ QUESTIONS 1-9: 0
2. FEELING DOWN, DEPRESSED OR HOPELESS: 0
SUM OF ALL RESPONSES TO PHQ QUESTIONS 1-9: 0
SUM OF ALL RESPONSES TO PHQ QUESTIONS 1-9: 0
SUM OF ALL RESPONSES TO PHQ9 QUESTIONS 1 & 2: 0
1. LITTLE INTEREST OR PLEASURE IN DOING THINGS: 0

## 2022-12-20 ASSESSMENT — ENCOUNTER SYMPTOMS
EYE PAIN: 0
ABDOMINAL PAIN: 0
SHORTNESS OF BREATH: 0
CONSTIPATION: 0
DIARRHEA: 0
COUGH: 0

## 2022-12-20 NOTE — PROGRESS NOTES
Chief Complaint   Patient presents with    Shoulder Pain     Left shoulder injury, may want xray    Congestion    Mass     Left buttock         ASSESSMENT/PLAN:  1. Acute pain of left shoulder  XR to rule out bony abnormality like arthritis, spurring  Other differentials include bursitis vs RTC injury vs tendonitis. If XR is unremarkable and shows degenerative changes, will order MRI to evaluate RTC  - XR SHOULDER LEFT (MIN 2 VIEWS); Future    2. Soft tissue mass  Follow up US to rule out lipoma   May need more sensitive imaging.   - US SOFT TISSUE LIMITED AREA; Future    3. Colon cancer screening  Ordered, follow up results  - Fecal DNA Colorectal cancer screening (Cologuard)       HPI:  Jennifer Kelly is a 52 y.o. (: 1975) here today for left shoulder pain and mass on glute. 5 months ago, mechanical fall and on her way down, grabbed the top of a shelf with left hand to stop from falling on face. Her left shoulder has not felt the same since. FROM intact but some hurt, 5-7/10. Reaching back and lifting heavy object causes sharp pain. Also feels some shifting of tissues. Cant lay on it at night. H/o of left shoulder arthroscopic surgery to \"clean it up. \" No numbness or tingling or shooting pain down arm. Progressively worsening. Left glute, has a small mass. First noticed it about 3 weeks ago. No change, tenderness or pain. Sometimes it bothers her when she is sitting for a long period of time. No injury to this area. No superficial skin change. Open to update colon cancer screening with cologuard. No fam hx of colon cancer and no blood in stool. Review of Systems   Constitutional:  Negative for appetite change, chills, fatigue and fever. HENT:  Negative for congestion. Eyes:  Negative for pain and visual disturbance. Respiratory:  Negative for cough and shortness of breath. Cardiovascular:  Negative for chest pain and palpitations.    Gastrointestinal:  Negative for abdominal pain, constipation and diarrhea. Genitourinary:  Negative for difficulty urinating. Musculoskeletal:  Positive for arthralgias. Skin:  Negative for rash and wound. Neurological:  Negative for dizziness, weakness, light-headedness and headaches. Hematological:  Does not bruise/bleed easily. Psychiatric/Behavioral:  Negative for behavioral problems. Past Medical History:   Diagnosis Date    Adhesive capsulitis of right shoulder 11/30/2018    Anesthesia complication     slow to wake up    Cholecystitis with cholelithiasis 2/10/2014    Gall stones     IBS (irritable bowel syndrome)     In vitro fertilization     Infertility, female     Mononucleosis     UTI (lower urinary tract infection) 4weeks ago       Family History   Problem Relation Age of Onset    Breast Cancer Maternal Grandmother 44    Diabetes Mother     Hypertension Mother     Mult Sclerosis Father        Social History     Tobacco Use    Smoking status: Never    Smokeless tobacco: Never   Vaping Use    Vaping Use: Never used   Substance Use Topics    Alcohol use: Not Currently     Alcohol/week: 2.0 standard drinks     Types: 2 Glasses of wine per week     Comment: social    Drug use: No       New Prescriptions    No medications on file       Meds Prior to visit:  No current outpatient medications on file prior to visit. No current facility-administered medications on file prior to visit. No Known Allergies    OBJECTIVE:  /79   Pulse 76   Temp 97.1 °F (36.2 °C)   Ht 5' 6\" (1.676 m)   Wt 163 lb (73.9 kg)   SpO2 100%   BMI 26.31 kg/m²   BP Readings from Last 2 Encounters:   12/20/22 127/79   10/12/22 126/69     Wt Readings from Last 3 Encounters:   12/20/22 163 lb (73.9 kg)   10/12/22 163 lb 12.8 oz (74.3 kg)   09/22/22 161 lb (73 kg)       Physical Exam  Vitals reviewed. Constitutional:       General: She is not in acute distress. Appearance: Normal appearance. She is not ill-appearing.    HENT: Head: Normocephalic and atraumatic. Right Ear: External ear normal.      Left Ear: External ear normal.      Nose: Nose normal. No congestion. Mouth/Throat:      Mouth: Mucous membranes are moist.      Pharynx: Oropharynx is clear. No oropharyngeal exudate. Eyes:      Extraocular Movements: Extraocular movements intact. Conjunctiva/sclera: Conjunctivae normal.      Pupils: Pupils are equal, round, and reactive to light. Cardiovascular:      Rate and Rhythm: Normal rate and regular rhythm. Pulses: Normal pulses. Heart sounds: Normal heart sounds. Pulmonary:      Effort: Pulmonary effort is normal. No respiratory distress. Breath sounds: Normal breath sounds. No stridor. No wheezing, rhonchi or rales. Abdominal:      General: Bowel sounds are normal.      Palpations: Abdomen is soft. Tenderness: There is no abdominal tenderness. Musculoskeletal:         General: Normal range of motion. Cervical back: Normal range of motion and neck supple. Right lower leg: No edema. Left lower leg: No edema. Skin:     General: Skin is warm. Capillary Refill: Capillary refill takes less than 2 seconds. Findings: No erythema or rash. Comments: 1 to 1.5 inch firm mass, difficult to appreciate borders given surrounding tissue but feels like they are clear and smooth. No tenderness to palpation unless pinched horizontally. Seems oval shaped. Neurological:      General: No focal deficit present. Mental Status: She is alert and oriented to person, place, and time. Mental status is at baseline. Motor: No weakness. Coordination: Coordination normal.      Gait: Gait normal.   Psychiatric:         Mood and Affect: Mood normal.         Behavior: Behavior normal.         Thought Content:  Thought content normal.         Judgment: Judgment normal.       Lab Results   Component Value Date    WBC 11.2 (H) 11/07/2020    HGB 9.2 (L) 11/07/2020    HCT 27.7 (L) 11/07/2020    MCV 88.0 11/07/2020     11/07/2020     Lab Results   Component Value Date     04/29/2021    K 4.7 04/29/2021     04/29/2021    CO2 25 04/29/2021    BUN 10 04/29/2021    CREATININE 0.9 04/29/2021    GLUCOSE 78 04/29/2021    CALCIUM 8.9 04/29/2021    PROT 7.0 04/29/2021    LABALBU 4.5 04/29/2021    BILITOT <0.2 04/29/2021    ALKPHOS 78 04/29/2021    AST 18 04/29/2021    ALT 21 04/29/2021    LABGLOM >60 04/29/2021    GFRAA >60 04/29/2021    AGRATIO 1.8 04/29/2021    GLOB 2.5 04/29/2021     Lab Results   Component Value Date    CHOL 165 04/29/2021     Lab Results   Component Value Date    TRIG 98 04/29/2021     Lab Results   Component Value Date    HDL 62 (H) 04/29/2021     Lab Results   Component Value Date    LDLCALC 83 04/29/2021     Lab Results   Component Value Date    LABVLDL 20 04/29/2021     Lab Results   Component Value Date    LABA1C 5.5 04/29/2021       Discussed use, benefit, and side effects of prescribed medications. Barriers to medication compliance addressed. All patient questions answered. Pt voiced understanding. RTC No follow-ups on file. No future appointments.     Ivelisse Benson MD  12/20/2022  11:37 AM

## 2023-01-09 ENCOUNTER — HOSPITAL ENCOUNTER (OUTPATIENT)
Dept: ULTRASOUND IMAGING | Age: 48
Discharge: HOME | End: 2023-01-09
Payer: COMMERCIAL

## 2023-01-09 ENCOUNTER — OFFICE VISIT (OUTPATIENT)
Dept: ORTHOPEDIC SURGERY | Age: 48
End: 2023-01-09
Payer: COMMERCIAL

## 2023-01-09 VITALS — HEIGHT: 66 IN | WEIGHT: 157 LBS | BODY MASS INDEX: 25.23 KG/M2 | RESPIRATION RATE: 18 BRPM

## 2023-01-09 DIAGNOSIS — M75.82 TENDINITIS OF LEFT ROTATOR CUFF: Primary | ICD-10-CM

## 2023-01-09 DIAGNOSIS — M79.89 SOFT TISSUE MASS: ICD-10-CM

## 2023-01-09 PROCEDURE — 99203 OFFICE O/P NEW LOW 30 MIN: CPT | Performed by: ORTHOPAEDIC SURGERY

## 2023-01-09 PROCEDURE — 20610 DRAIN/INJ JOINT/BURSA W/O US: CPT | Performed by: ORTHOPAEDIC SURGERY

## 2023-01-09 PROCEDURE — 76999 ECHO EXAMINATION PROCEDURE: CPT

## 2023-01-09 RX ORDER — BUPIVACAINE HYDROCHLORIDE 2.5 MG/ML
2 INJECTION, SOLUTION INFILTRATION; PERINEURAL ONCE
Status: COMPLETED | OUTPATIENT
Start: 2023-01-09 | End: 2023-01-09

## 2023-01-09 RX ORDER — TRIAMCINOLONE ACETONIDE 40 MG/ML
40 INJECTION, SUSPENSION INTRA-ARTICULAR; INTRAMUSCULAR ONCE
Status: COMPLETED | OUTPATIENT
Start: 2023-01-09 | End: 2023-01-09

## 2023-01-09 RX ADMIN — TRIAMCINOLONE ACETONIDE 40 MG: 40 INJECTION, SUSPENSION INTRA-ARTICULAR; INTRAMUSCULAR at 14:59

## 2023-01-09 RX ADMIN — BUPIVACAINE HYDROCHLORIDE 5 MG: 2.5 INJECTION, SOLUTION INFILTRATION; PERINEURAL at 14:59

## 2023-01-10 NOTE — PROGRESS NOTES
[facility]  [facility]    Chief Complaint: Left shoulder pain    HPI:  Vinny Riley is a 52 y.o. who is here for initial assessment of left shoulder pain. This has been present for many months but worsened over the last few months. She is right-hand dominant. She does recall falling on outstretched left arm about 6 months ago. Otherwise there is no history of injury or surgery. She reports a dull ache in her anterior lateral shoulder. This is worse with lying down. She has trouble reaching behind her and out to the side. She denies neck pain, rating pain, numbness, tingling, weakness in the arm. She does report left shoulder arthroscopy 1999 but does not remember the details. She rates the pain as 2/10. Patient Active Problem List   Diagnosis    Cervical stenosis (uterine cervix)    Irritable bowel syndrome    Pelvic floor dysfunction    Seasonal allergies    Perimenopause    THONY (stress urinary incontinence, female)       ROS:  Constitutional: denies fever, chills, weight loss  MSK: denies pain in other joints, muscle aches  Neurological: denies numbness, tingling, weakness    Exam:  Resp. rate 18, height 5' 6\" (1.676 m), weight 157 lb (71.2 kg)    Appearance: sitting in exam room chair, appears to be in no acute distress, awake and alert  Resp: unlabored breathing on room air  Skin: warm, dry and intact with out erythema or significant increased temperature  LUE: Examination of the shoulder shows no gross defects. Active shoulder forward flexion is 165 degrees, external rotation 45 degrees at the neutral position, internal rotation to lumbar spine. Pain with impingement testing. Mild tenderness over long head of the biceps tendon. Sensation is intact light touch. Brisk capillary refill. 2+ radial pulse. Strength is 5/5 in all muscle groups. Imaging:  Prior left shoulder radiographs reviewed today. Significant for maintained glenohumeral joint space. There are no fractures or dislocations. She does have calcific rotator cuff tendinitis. There is an area of sclerosis at the greater tuberosity. Assessment:  Left shoulder rotator cuff tendinitis    Plan:  We discussed the diagnosis and treatment options. She will continue NSAIDs as needed for pain. I recommended physical therapy to help with strength and range of motion. Finally, I recommended and performed a left shoulder subacromial steroid injection today as described below. She will return in 6 weeks for repeat evaluation. PROCEDURE NOTE:  After verbal consent was obtained, the patient's left shoulder was prepped with alcohol and anesthetized with ethyl chloride. The subacromial space was then injected under sterile technique with 2mL of 0.25% marcaine and 1 mL of 40 mg/mL Kenalog. A bandage was applied. The patient tolerated procedure well and there were no complications. Total time spent on today's encounter was at least 31 minutes. This time included reviewing prior notes, radiographs, and lab results when available, reviewing history obtained by medical assistant, performing history and physical exam, reviewing tests/radiographs with the patient, counseling the patient, ordering medications or tests, documentation in the electronic health record, and coordination of care. This dictation was done with [employer] dictation and may contain mechanical errors related to translation.

## 2023-01-27 ENCOUNTER — OFFICE VISIT (OUTPATIENT)
Dept: PRIMARY CARE CLINIC | Age: 48
End: 2023-01-27

## 2023-01-27 VITALS
SYSTOLIC BLOOD PRESSURE: 108 MMHG | HEART RATE: 86 BPM | DIASTOLIC BLOOD PRESSURE: 64 MMHG | BODY MASS INDEX: 25.55 KG/M2 | OXYGEN SATURATION: 98 % | HEIGHT: 66 IN | WEIGHT: 159 LBS

## 2023-01-27 DIAGNOSIS — R68.83 CHILLS: ICD-10-CM

## 2023-01-27 DIAGNOSIS — H92.03 OTALGIA OF BOTH EARS: ICD-10-CM

## 2023-01-27 DIAGNOSIS — R05.1 ACUTE COUGH: ICD-10-CM

## 2023-01-27 DIAGNOSIS — J30.9 ALLERGIC RHINITIS, UNSPECIFIED SEASONALITY, UNSPECIFIED TRIGGER: ICD-10-CM

## 2023-01-27 DIAGNOSIS — R52 BODY ACHES: Primary | ICD-10-CM

## 2023-01-27 ASSESSMENT — ENCOUNTER SYMPTOMS
VOMITING: 0
SINUS PAIN: 0
ABDOMINAL PAIN: 0
APNEA: 0
WHEEZING: 0
SORE THROAT: 1
RHINORRHEA: 1
NAUSEA: 1
CHEST TIGHTNESS: 1
SINUS PRESSURE: 0
SHORTNESS OF BREATH: 0
FACIAL SWELLING: 0
COUGH: 1
DIARRHEA: 0

## 2023-01-27 ASSESSMENT — PATIENT HEALTH QUESTIONNAIRE - PHQ9
1. LITTLE INTEREST OR PLEASURE IN DOING THINGS: 0
SUM OF ALL RESPONSES TO PHQ QUESTIONS 1-9: 0
SUM OF ALL RESPONSES TO PHQ QUESTIONS 1-9: 0
SUM OF ALL RESPONSES TO PHQ9 QUESTIONS 1 & 2: 0
SUM OF ALL RESPONSES TO PHQ QUESTIONS 1-9: 0
2. FEELING DOWN, DEPRESSED OR HOPELESS: 0
SUM OF ALL RESPONSES TO PHQ QUESTIONS 1-9: 0

## 2023-01-27 NOTE — PROGRESS NOTES
Tyron Sood (:  1975) is a 52 y.o. female,Established patient, here for evaluation of the following chief complaint(s):  Otalgia (Pt is c/o of bilateral ear fullness and discomfort x 2 days chest heavyness,  runny nose- clear, no sinus pressure, x 1 week , pt has tried dayquil )    C/o ear pain/fullness, sore throat, chills, fatigue, body aches and some mild nausea x 2 days. Has had rhinorrhea and chest \"heaviness\" x last week. Denies V/D, fever, sweats, sinus pressure, SOB, wheezing. Has tried dayquil with little relief. Son and  with similar symptoms but no testing completed. Son is on abx for ear infection. ASSESSMENT/PLAN:  1. Body aches  -     COVID-19  2. Otalgia of both ears  -     COVID-19  3. Chills  -     COVID-19  4. Acute cough  -     COVID-19  5. Allergic rhinitis, unspecified seasonality, unspecified trigger    Return if symptoms worsen or fail to improve.     -Discussed conservative and symptomatic treatment of symptoms. Subjective   SUBJECTIVE/OBJECTIVE:      Review of Systems   Constitutional:  Positive for chills and fatigue. Negative for activity change, appetite change, diaphoresis and fever. HENT:  Positive for ear pain, postnasal drip, rhinorrhea and sore throat. Negative for congestion, facial swelling, hearing loss, sinus pressure and sinus pain. Respiratory:  Positive for cough and chest tightness. Negative for apnea, shortness of breath and wheezing. Cardiovascular:  Negative for chest pain, palpitations and leg swelling. Gastrointestinal:  Positive for nausea. Negative for abdominal pain, diarrhea and vomiting. Skin:  Negative for rash. Neurological:  Negative for dizziness, weakness and headaches. Objective   Physical Exam  Vitals and nursing note reviewed. Constitutional:       Appearance: Normal appearance. She is well-developed and well-groomed.    HENT:      Right Ear: Tympanic membrane, ear canal and external ear normal. Left Ear: Ear canal and external ear normal. A middle ear effusion is present. Tympanic membrane is not scarred, perforated, erythematous, retracted or bulging. Ears:      Comments: Mild ear effusion of left TM. Nose: Mucosal edema and rhinorrhea present. Rhinorrhea is clear. Right Sinus: No maxillary sinus tenderness or frontal sinus tenderness. Left Sinus: No maxillary sinus tenderness or frontal sinus tenderness. Mouth/Throat:      Lips: Pink. Pharynx: Oropharynx is clear. No pharyngeal swelling, oropharyngeal exudate, posterior oropharyngeal erythema or uvula swelling. Tonsils: No tonsillar exudate. 0 on the right. 0 on the left. Cardiovascular:      Rate and Rhythm: Normal rate and regular rhythm. Heart sounds: Normal heart sounds, S1 normal and S2 normal.   Pulmonary:      Effort: Pulmonary effort is normal.      Breath sounds: Normal breath sounds and air entry. Lymphadenopathy:      Head:      Right side of head: No submental, submandibular, tonsillar, preauricular or posterior auricular adenopathy. Left side of head: No submental, submandibular, tonsillar, preauricular or posterior auricular adenopathy. Cervical: No cervical adenopathy. Neurological:      Mental Status: She is alert. Psychiatric:         Behavior: Behavior is cooperative. An electronic signature was used to authenticate this note.     --Marlene Garcia, NICO - CNP

## 2023-01-28 LAB — SARS-COV-2, PCR: NOT DETECTED

## 2023-03-16 LAB — MAMMOGRAPHY, EXTERNAL: NORMAL

## 2023-05-17 ENCOUNTER — TELEPHONE (OUTPATIENT)
Dept: ORTHOPEDIC SURGERY | Age: 48
End: 2023-05-17

## 2023-05-19 ENCOUNTER — OFFICE VISIT (OUTPATIENT)
Dept: ORTHOPEDIC SURGERY | Age: 48
End: 2023-05-19

## 2023-05-19 VITALS — BODY MASS INDEX: 25.23 KG/M2 | WEIGHT: 157 LBS | HEIGHT: 66 IN

## 2023-05-19 DIAGNOSIS — M75.81 ROTATOR CUFF TENDINITIS, RIGHT: Primary | ICD-10-CM

## 2023-05-19 DIAGNOSIS — M75.82 ROTATOR CUFF TENDINITIS, LEFT: ICD-10-CM

## 2023-05-19 RX ORDER — TRIAMCINOLONE ACETONIDE 40 MG/ML
40 INJECTION, SUSPENSION INTRA-ARTICULAR; INTRAMUSCULAR ONCE
Status: COMPLETED | OUTPATIENT
Start: 2023-05-19 | End: 2023-05-19

## 2023-05-19 RX ORDER — BUPIVACAINE HYDROCHLORIDE 2.5 MG/ML
2 INJECTION, SOLUTION INFILTRATION; PERINEURAL ONCE
Status: COMPLETED | OUTPATIENT
Start: 2023-05-19 | End: 2023-05-19

## 2023-05-19 RX ADMIN — TRIAMCINOLONE ACETONIDE 40 MG: 40 INJECTION, SUSPENSION INTRA-ARTICULAR; INTRAMUSCULAR at 09:13

## 2023-05-19 RX ADMIN — BUPIVACAINE HYDROCHLORIDE 5 MG: 2.5 INJECTION, SOLUTION INFILTRATION; PERINEURAL at 09:12

## 2023-05-19 NOTE — PROGRESS NOTES
This dictation was done with [employer] dictation and may contain mechanical errors related to translation. Height 5' 6\" (1.676 m), weight 157 lb (71.2 kg), not currently breastfeeding. This is a pleasant, well-developed patient in no acute distress. They are alert and oriented ×3. They have had continued pain in their left shoulder that's aggravated with overhead activities or sleeping on it. They deny any significant radicular pain or neuropathy. They've had injections in the past of cortisone and has helped with her symptoms. They noticed that there was some increasing pain and and swelling and disability over the last 7 to 10 days especially. This increase led to them making an appointment. She also admitted to having had a previous subacromial decompression 10+ years ago. At today's visit in addition to the rotator cuff tendinitis we noted some rounding of the shoulders and some laxity of the postural muscles    On examination, there is a positive Harlan and Branch's test. Impingement pain with crossover and weakness to supraspinatus strength testing. There is good distal pulses with good dorsiflexion and palmar flexion strength. There are no cutaneous lesions or lymphadenopathy present. There is approximately 170° of forward flexion and 100° of abduction. My impression is left shoulder rotator cuff tendinitis with impingement syndrome. Additionally, she has scapular stabilizer weakness    After a discussion of the multiple options, they consented to a cortisone shot. 1 ml of 40mg/ml Kenalog and 2 ml's of 0.25%Marcaine were injected into the left shoulder sub acromial space. This time around she she did not go to physical therapy, I gave her a band some specific exercises for postural improvement and rotator cuff strengthening. This was done with sterile technique and they tolerated it well.  During today's visit, there was approximately 20 minutes of face-to-face discussion in regards to the

## 2023-11-09 ENCOUNTER — OFFICE VISIT (OUTPATIENT)
Age: 48
End: 2023-11-09

## 2023-11-09 VITALS
BODY MASS INDEX: 25.82 KG/M2 | HEART RATE: 102 BPM | OXYGEN SATURATION: 98 % | DIASTOLIC BLOOD PRESSURE: 68 MMHG | SYSTOLIC BLOOD PRESSURE: 102 MMHG | TEMPERATURE: 98.7 F | WEIGHT: 160 LBS

## 2023-11-09 DIAGNOSIS — R05.1 ACUTE COUGH: ICD-10-CM

## 2023-11-09 DIAGNOSIS — J06.9 VIRAL URI WITH COUGH: Primary | ICD-10-CM

## 2023-11-09 LAB
Lab: NORMAL
QC PASS/FAIL: NORMAL
SARS-COV-2 RDRP RESP QL NAA+PROBE: NEGATIVE

## 2023-11-09 RX ORDER — BENZONATATE 200 MG/1
200 CAPSULE ORAL 3 TIMES DAILY PRN
Qty: 30 CAPSULE | Refills: 0 | Status: SHIPPED | OUTPATIENT
Start: 2023-11-09

## 2023-11-09 RX ORDER — GUAIFENESIN 600 MG/1
1200 TABLET, EXTENDED RELEASE ORAL 2 TIMES DAILY
Qty: 40 TABLET | Refills: 0 | COMMUNITY
Start: 2023-11-09

## 2023-11-09 ASSESSMENT — ENCOUNTER SYMPTOMS
ABDOMINAL PAIN: 0
SHORTNESS OF BREATH: 0
RHINORRHEA: 1
COUGH: 1
SORE THROAT: 0
WHEEZING: 0
NAUSEA: 0
HEMOPTYSIS: 0
VOMITING: 0
DIARRHEA: 0

## 2023-11-09 NOTE — PROGRESS NOTES
Nina Rockwell (:  1975) is a 52 y.o. female, New patient, here for evaluation of the following chief complaint(s):  Cough (Symptom since .)    ASSESSMENT/PLAN:    ICD-10-CM    1. Viral URI with cough  J06.9 benzonatate (TESSALON) 200 MG capsule     guaiFENesin (MUCINEX) 600 MG extended release tablet      2. Acute cough  R05.1 POCT COVID-19 Rapid, NAAT        POC testing: Discussed result(s) with patient  COVID- negative    Ddx includes: PNA, bronchitis, COVID, URI  Disposition: PT presents for ongoing cough. VSS. Lungs clear, erythematous pharynx. COVID negative. Will prescribe benzonate for cough. She is to start taking OTC Mucinex. She is to increase fluids, take tylenol/ibuprofen as needed for pain/fever, and get plenty of rest.   Education and handout provided on diagnosis and management of symptoms. AVS reviewed with patient. All questions answered. If symptoms worsen go to the Emergency Department. Follow up in clinic or with PCP as needed. Patient is agreeable with plan. SUBJECTIVE/OBJECTIVE:  53yo F presents for cough and runny nose that started 5 days ago. Son recently had PNA and  has been sick with URI symptoms. Has been taken Robitussin DM with some relief. She reports the cough feels deep and is dry. She was seen at a different urgent care 3 days ago and was dx with URI. Strep, FLU, and COVID testing negative. History provided by:  Patient   used: No    Cough  This is a new problem. The current episode started in the past 7 days. The problem has been gradually worsening. The cough is Non-productive. Associated symptoms include chills, headaches, myalgias, postnasal drip and rhinorrhea. Pertinent negatives include no fever, hemoptysis, nasal congestion, sore throat, shortness of breath or wheezing. She has tried OTC cough suppressant for the symptoms. The treatment provided mild relief. There is no history of asthma, COPD or pneumonia.

## 2024-01-16 ENCOUNTER — TELEMEDICINE (OUTPATIENT)
Dept: PRIMARY CARE CLINIC | Age: 49
End: 2024-01-16
Payer: COMMERCIAL

## 2024-01-16 DIAGNOSIS — U07.1 COVID-19: Primary | ICD-10-CM

## 2024-01-16 PROCEDURE — 99213 OFFICE O/P EST LOW 20 MIN: CPT | Performed by: NURSE PRACTITIONER

## 2024-01-16 ASSESSMENT — PATIENT HEALTH QUESTIONNAIRE - PHQ9
2. FEELING DOWN, DEPRESSED OR HOPELESS: 0
SUM OF ALL RESPONSES TO PHQ9 QUESTIONS 1 & 2: 0
SUM OF ALL RESPONSES TO PHQ QUESTIONS 1-9: 0
1. LITTLE INTEREST OR PLEASURE IN DOING THINGS: 0

## 2024-01-16 ASSESSMENT — ENCOUNTER SYMPTOMS
SORE THROAT: 1
CHEST TIGHTNESS: 1
ABDOMINAL PAIN: 0
SHORTNESS OF BREATH: 0
WHEEZING: 0
COUGH: 1

## 2024-01-16 NOTE — PROGRESS NOTES
though does endorse some chest pressure/tightness as mentioned above.     Review of Systems   Constitutional:  Positive for chills and fatigue.   HENT:  Positive for congestion and sore throat.    Respiratory:  Positive for cough and chest tightness. Negative for shortness of breath and wheezing.    Gastrointestinal:  Negative for abdominal pain.   Musculoskeletal:  Positive for myalgias.   Neurological:  Positive for headaches.          Objective   Patient-Reported Vitals  No data recorded     Physical Exam  Constitutional:       General: She is not in acute distress.     Appearance: Normal appearance. She is ill-appearing.   HENT:      Head: Normocephalic and atraumatic.   Eyes:      Extraocular Movements: Extraocular movements intact.   Pulmonary:      Effort: Pulmonary effort is normal.   Neurological:      General: No focal deficit present.      Mental Status: She is alert and oriented to person, place, and time.   Psychiatric:         Mood and Affect: Mood normal.         Behavior: Behavior normal.                  --Dora Brady, NICO - CNP

## 2024-08-15 ENCOUNTER — OFFICE VISIT (OUTPATIENT)
Dept: ORTHOPEDIC SURGERY | Age: 49
End: 2024-08-15
Payer: COMMERCIAL

## 2024-08-15 DIAGNOSIS — M75.82 ROTATOR CUFF TENDINITIS, LEFT: Primary | ICD-10-CM

## 2024-08-15 PROCEDURE — 99213 OFFICE O/P EST LOW 20 MIN: CPT | Performed by: PHYSICIAN ASSISTANT

## 2024-08-15 RX ORDER — TRIAMCINOLONE ACETONIDE 40 MG/ML
40 INJECTION, SUSPENSION INTRA-ARTICULAR; INTRAMUSCULAR ONCE
Status: COMPLETED | OUTPATIENT
Start: 2024-08-15 | End: 2024-08-15

## 2024-08-15 RX ORDER — BUPIVACAINE HYDROCHLORIDE 2.5 MG/ML
2 INJECTION, SOLUTION INFILTRATION; PERINEURAL ONCE
Status: COMPLETED | OUTPATIENT
Start: 2024-08-15 | End: 2024-08-15

## 2024-08-15 RX ADMIN — BUPIVACAINE HYDROCHLORIDE 5 MG: 2.5 INJECTION, SOLUTION INFILTRATION; PERINEURAL at 09:29

## 2024-08-15 RX ADMIN — TRIAMCINOLONE ACETONIDE 40 MG: 40 INJECTION, SUSPENSION INTRA-ARTICULAR; INTRAMUSCULAR at 09:29

## 2024-08-15 NOTE — PROGRESS NOTES
This dictation was done with [employer]on dictation and may contain mechanical errors related to translation.    This is a very pleasant 48-year-old female who is here in follow-up for her left shoulder.  It has been more than a year since we have seen her for her shoulder.  She had a touch of adhesive capsulitis and rotator cuff tendinitis previously really on both sides.  Her right shoulder has been doing well and the left one did better after she had a pop on the playground.  Its only been the last 4 to 6 weeks as she has had a significant impingement type symptoms.    She was sent for x-rays including AP transaxillary view and Y view x-ray of her left shoulder.  This showed downsloping of the acromion but not superior migration of the humeral head the acromioclavicular joint looks very good there is some changes through the distal clavicle showing some impingement.  And she has little to no osteoarthritis of the glenohumeral joint no fractures or other bony abnormalities are noted otherwise.    On examination is very pleasant 40-year-old female who is alert and oriented x 3 she is get good full symmetric motion of the neck with a negative Spurling's test.  She has equivalent range of motion with both shoulders of 180 degrees of forward flexion and 130 degrees of abduction.  She has weakness to supraspinatus strength testing on the left with a positive impingement and crossover test which are negative on the opposite side.  She has good  strength and wrist extension strength.    My impression is left shoulder rotator cuff tendinitis and impingement syndrome    We did about short long-term expectations and with her consent she was injected with 1 cc Kenalog and 2 cc of Marcaine into the left shoulder subacromial space.  I reiterated the importance of posture and the exercises to work on her scapular stabilizing strength as well as her supraspinatus    She will follow-up with us on a as needed basis

## (undated) DEVICE — COVER LT HNDL BLU PLAS

## (undated) DEVICE — SAFESECURE,SECUREMENT,FOLEY CATH,STERILE: Brand: MEDLINE

## (undated) DEVICE — SOLUTION IV IRRIG POUR BRL 0.9% SODIUM CHL 2F7124

## (undated) DEVICE — SUTURE ABSRB BRAID COAT UD CTX 3-0 36IN VCRL J980H

## (undated) DEVICE — LARGE, DISPOSABLE ALEXIS O C-SECTION PROTECTOR - RETRACTOR: Brand: ALEXIS ® O C-SECTION PROTECTOR - RETRACTOR

## (undated) DEVICE — SUTURE VCRL SZ 4-0 L27IN ABSRB UD L60MM KS STR REV CUT NDL J662H

## (undated) DEVICE — CANISTER, RIGID, 3000CC: Brand: MEDLINE INDUSTRIES, INC.

## (undated) DEVICE — SUTURE MCRYL SZ 0 L36IN ABSRB VLT L48MM CTX 1/2 CIR Y398H

## (undated) DEVICE — SKIN AFFIX SURG ADHESIVE 72/CS 0.55ML: Brand: MEDLINE

## (undated) DEVICE — CATHETER TRAY 16 FR 5 CC FOL ANTIREFLX SAMPLING PRT DOVER

## (undated) DEVICE — BAG,SPONGE COUNTER,BLUE,50/BX,5BX/CS: Brand: MEDLINE

## (undated) DEVICE — Device

## (undated) DEVICE — 9165 UNIVERSAL PATIENT PLATE: Brand: 3M™

## (undated) DEVICE — SUTURE COAT VCRL SZ 0 L36IN ABSRB VLT CTX L48MM TAPERPOINT J370H

## (undated) DEVICE — GARMENT,MEDLINE,DVT,INT,CALF,MED, GEN2: Brand: MEDLINE

## (undated) DEVICE — GLOVE,SURG,SENSICARE SLT,LF,PF,6.5: Brand: MEDLINE

## (undated) DEVICE — CHLORAPREP 26ML ORANGE

## (undated) DEVICE — POOLE SUCTION INSTRUMENT,RIGID: Brand: ARGYLE

## (undated) DEVICE — SPONGE LAP W18XL18IN WHT COT 4 PLY FLD STRUNG RADPQ DISP ST